# Patient Record
Sex: FEMALE | Race: WHITE | Employment: OTHER | URBAN - METROPOLITAN AREA
[De-identification: names, ages, dates, MRNs, and addresses within clinical notes are randomized per-mention and may not be internally consistent; named-entity substitution may affect disease eponyms.]

---

## 2017-10-02 ENCOUNTER — OFFICE VISIT (OUTPATIENT)
Dept: FAMILY MEDICINE CLINIC | Facility: CLINIC | Age: 37
End: 2017-10-02

## 2017-10-02 VITALS
RESPIRATION RATE: 16 BRPM | HEART RATE: 83 BPM | DIASTOLIC BLOOD PRESSURE: 69 MMHG | BODY MASS INDEX: 30 KG/M2 | SYSTOLIC BLOOD PRESSURE: 100 MMHG | TEMPERATURE: 98 F | WEIGHT: 175.63 LBS

## 2017-10-02 DIAGNOSIS — H92.02 LEFT EAR PAIN: Primary | ICD-10-CM

## 2017-10-02 PROCEDURE — 99212 OFFICE O/P EST SF 10 MIN: CPT | Performed by: FAMILY MEDICINE

## 2017-10-02 PROCEDURE — 99213 OFFICE O/P EST LOW 20 MIN: CPT | Performed by: FAMILY MEDICINE

## 2017-10-02 RX ORDER — ALBUTEROL SULFATE 90 UG/1
2 AEROSOL, METERED RESPIRATORY (INHALATION) EVERY 4 HOURS PRN
Qty: 1 INHALER | Refills: 2 | Status: SHIPPED | OUTPATIENT
Start: 2017-10-02 | End: 2018-02-06

## 2017-10-02 RX ORDER — MONTELUKAST SODIUM 10 MG/1
10 TABLET ORAL DAILY
Qty: 30 TABLET | Refills: 3 | Status: SHIPPED | OUTPATIENT
Start: 2017-10-02 | End: 2018-03-01 | Stop reason: ALTCHOICE

## 2017-10-02 NOTE — PROGRESS NOTES
HPI: Israel Irene is a 40year old female who presents for left ear pain for one week. Started as dull ache and then persisted. Difficulty eating as well. Has allergic symptoms- nasal congestion. Has had ear infections in the past few years.   Hearing sligh

## 2018-02-06 ENCOUNTER — OFFICE VISIT (OUTPATIENT)
Dept: FAMILY MEDICINE CLINIC | Facility: CLINIC | Age: 38
End: 2018-02-06

## 2018-02-06 VITALS
RESPIRATION RATE: 16 BRPM | TEMPERATURE: 98 F | HEART RATE: 73 BPM | OXYGEN SATURATION: 97 % | DIASTOLIC BLOOD PRESSURE: 74 MMHG | SYSTOLIC BLOOD PRESSURE: 112 MMHG | HEIGHT: 64 IN

## 2018-02-06 DIAGNOSIS — J45.21 MILD INTERMITTENT ASTHMA WITH ACUTE EXACERBATION: Primary | ICD-10-CM

## 2018-02-06 PROCEDURE — 99212 OFFICE O/P EST SF 10 MIN: CPT | Performed by: FAMILY MEDICINE

## 2018-02-06 PROCEDURE — 99214 OFFICE O/P EST MOD 30 MIN: CPT | Performed by: FAMILY MEDICINE

## 2018-02-06 RX ORDER — ALBUTEROL SULFATE 90 UG/1
2 AEROSOL, METERED RESPIRATORY (INHALATION) EVERY 4 HOURS PRN
Qty: 1 INHALER | Refills: 2 | Status: SHIPPED | OUTPATIENT
Start: 2018-02-06

## 2018-02-06 RX ORDER — ALBUTEROL SULFATE 2.5 MG/3ML
2.5 SOLUTION RESPIRATORY (INHALATION) ONCE
Status: SHIPPED | OUTPATIENT
Start: 2018-02-06

## 2018-02-06 RX ORDER — FLUTICASONE PROPIONATE 50 MCG
2 SPRAY, SUSPENSION (ML) NASAL DAILY
Qty: 1 BOTTLE | Refills: 1 | Status: SHIPPED | OUTPATIENT
Start: 2018-02-06 | End: 2019-02-01

## 2018-02-06 RX ORDER — PREDNISONE 20 MG/1
20 TABLET ORAL DAILY
Qty: 5 TABLET | Refills: 0 | Status: SHIPPED | OUTPATIENT
Start: 2018-02-06 | End: 2018-02-11

## 2018-02-06 RX ORDER — ALBUTEROL SULFATE 2.5 MG/3ML
2.5 SOLUTION RESPIRATORY (INHALATION) EVERY 6 HOURS PRN
Qty: 50 AMPULE | Refills: 0 | Status: SHIPPED | OUTPATIENT
Start: 2018-02-06

## 2018-02-06 NOTE — PROGRESS NOTES
HPI:    So Villagran is a 41year old female presents to clinic with concerns regarding asthma. States that Thursday night she cleaned out her basement and was feeling a bit tight in her lungs. States that over the weekend she was ok.  Last night she mg total) by mouth daily.  Disp: 30 tablet Rfl: 3       Allergies:    Ceclor [Cefaclor]             ROS:   See HPI     PHYSICAL EXAM:      02/06/18  1022   BP: 112/74   BP Location: Left arm   Patient Position: Sitting   Cuff Size: adult   Pulse: 73   Resp: Wheezing. predniSONE 20 MG Oral Tab 5 tablet 0      Sig: Take 1 tablet (20 mg total) by mouth daily.       albuterol sulfate (2.5 MG/3ML) 0.083% Inhalation Nebu Soln 50 ampule 0      Sig: Take 3 mL (2.5 mg total) by nebulization every 6 (six) hours as

## 2018-02-09 ENCOUNTER — APPOINTMENT (OUTPATIENT)
Dept: GENERAL RADIOLOGY | Age: 38
End: 2018-02-09
Attending: FAMILY MEDICINE
Payer: COMMERCIAL

## 2018-02-09 ENCOUNTER — NURSE TRIAGE (OUTPATIENT)
Dept: OTHER | Age: 38
End: 2018-02-09

## 2018-02-09 ENCOUNTER — HOSPITAL ENCOUNTER (OUTPATIENT)
Age: 38
Discharge: HOME | End: 2018-02-09
Attending: FAMILY MEDICINE
Payer: COMMERCIAL

## 2018-02-09 VITALS
OXYGEN SATURATION: 99 % | WEIGHT: 185 LBS | SYSTOLIC BLOOD PRESSURE: 118 MMHG | DIASTOLIC BLOOD PRESSURE: 69 MMHG | HEART RATE: 91 BPM | RESPIRATION RATE: 20 BRPM | HEIGHT: 64 IN | BODY MASS INDEX: 31.58 KG/M2 | TEMPERATURE: 97 F

## 2018-02-09 DIAGNOSIS — J45.901 ASTHMA EXACERBATION, MILD: ICD-10-CM

## 2018-02-09 DIAGNOSIS — J01.10 ACUTE NON-RECURRENT FRONTAL SINUSITIS: Primary | ICD-10-CM

## 2018-02-09 LAB — B-HCG UR QL: NEGATIVE

## 2018-02-09 PROCEDURE — 71046 X-RAY EXAM CHEST 2 VIEWS: CPT | Performed by: FAMILY MEDICINE

## 2018-02-09 PROCEDURE — 99214 OFFICE O/P EST MOD 30 MIN: CPT

## 2018-02-09 PROCEDURE — 99213 OFFICE O/P EST LOW 20 MIN: CPT

## 2018-02-09 PROCEDURE — 81025 URINE PREGNANCY TEST: CPT

## 2018-02-09 RX ORDER — PREDNISONE 20 MG/1
20 TABLET ORAL 2 TIMES DAILY
Qty: 6 TABLET | Refills: 0 | Status: SHIPPED | OUTPATIENT
Start: 2018-02-09 | End: 2018-02-12

## 2018-02-09 RX ORDER — AMOXICILLIN AND CLAVULANATE POTASSIUM 875; 125 MG/1; MG/1
1 TABLET, FILM COATED ORAL 2 TIMES DAILY
Qty: 20 TABLET | Refills: 0 | Status: SHIPPED | OUTPATIENT
Start: 2018-02-09 | End: 2018-02-19

## 2018-02-09 NOTE — ED PROVIDER NOTES
Patient Seen in: [address]    History   Patient presents with:  Dyspnea LISA SOB (respiratory)    Stated Complaint: asthma acting up - shortness of breath & chest pain    HPI  58-year-old female with a past medical histo Ht 162.6 cm (5' 4\")   Wt 83.9 kg   LMP 01/14/2018   SpO2 99%   BMI 31.76 kg/m²         Physical Exam   Constitutional: She is oriented to person, place, and time. She appears well-developed. No distress.    HENT:   Right Ear: Tympanic membrane and ear Sharilyn Plants visible mass or adenopathy. LUNGS/PLEURA: Normal.  No significant pulmonary parenchymal abnormalities.  No effusion or pleural thickening.    BONES: Minimal lower thoracic spondylosis.   OTHER: Negative.    Dictated by (CST): Jasmin Lugo MD on 2/09/20

## 2018-02-09 NOTE — TELEPHONE ENCOUNTER
Spoke with pt and Dr Agudelo Doing recommendations given. Pt scheduled OV 2/12/18 and agrees to go to UC over weekend if symptoms worsen.

## 2018-02-09 NOTE — TELEPHONE ENCOUNTER
Action Requested: Summary for Provider     []  Critical Lab, Recommendations Needed  [] Need Additional Advice  []   FYI    []   Need Orders  [] Need Medications Sent to Pharmacy  []  Other     SUMMARY: Pt is calling for advice, possibly higher dose of pre

## 2018-02-09 NOTE — ED INITIAL ASSESSMENT (HPI)
Per pt has been feeling SOB since Monday reports was seen by PMD on Tuesday was given prednisone for asthma and reports has not been feeling better. Reports used nebulizer twice today since symptoms have worsened.  Pt reports slight nasal congestion reports

## 2018-02-12 ENCOUNTER — OFFICE VISIT (OUTPATIENT)
Dept: FAMILY MEDICINE CLINIC | Facility: CLINIC | Age: 38
End: 2018-02-12

## 2018-02-12 VITALS
WEIGHT: 185 LBS | HEIGHT: 64 IN | DIASTOLIC BLOOD PRESSURE: 73 MMHG | BODY MASS INDEX: 31.58 KG/M2 | SYSTOLIC BLOOD PRESSURE: 104 MMHG | HEART RATE: 78 BPM | TEMPERATURE: 98 F | RESPIRATION RATE: 14 BRPM

## 2018-02-12 DIAGNOSIS — J45.21 MILD INTERMITTENT ASTHMA WITH EXACERBATION: Primary | ICD-10-CM

## 2018-02-12 PROCEDURE — 99212 OFFICE O/P EST SF 10 MIN: CPT | Performed by: FAMILY MEDICINE

## 2018-02-12 PROCEDURE — 99213 OFFICE O/P EST LOW 20 MIN: CPT | Performed by: FAMILY MEDICINE

## 2018-02-12 RX ORDER — PREDNISONE 10 MG/1
TABLET ORAL
Qty: 9 TABLET | Refills: 0 | Status: SHIPPED | OUTPATIENT
Start: 2018-02-12 | End: 2018-03-01 | Stop reason: ALTCHOICE

## 2018-02-13 NOTE — H&P
HPI:    Vika Miles is a 41year old female presents to clinic for follow up. States that she went in to UC a few days back after OV was told she may have a sinus infection. Prednisone was increased. Normal CXR.  Says today she does not feel that mu Allergies:    Ceclor [Cefaclor]             ROS:   See HPI   PHYSICAL EXAM:      02/12/18  1751   BP: 104/73   BP Location: Left arm   Patient Position: Sitting   Cuff Size: adult   Pulse: 78   Resp: 14   Temp: 97.7 °F (36.5 °C)   TempSrc: Oral   Tyrone De Pazfield MD

## 2018-02-14 ENCOUNTER — HOSPITAL ENCOUNTER (OUTPATIENT)
Age: 38
Discharge: HOME | End: 2018-02-14
Attending: FAMILY MEDICINE
Payer: COMMERCIAL

## 2018-02-14 ENCOUNTER — TELEPHONE (OUTPATIENT)
Dept: FAMILY MEDICINE CLINIC | Facility: CLINIC | Age: 38
End: 2018-02-14

## 2018-02-14 ENCOUNTER — HOSPITAL ENCOUNTER (OUTPATIENT)
Dept: CT IMAGING | Facility: HOSPITAL | Age: 38
Discharge: HOME | End: 2018-02-14
Attending: FAMILY MEDICINE
Payer: COMMERCIAL

## 2018-02-14 VITALS
WEIGHT: 185 LBS | OXYGEN SATURATION: 97 % | TEMPERATURE: 98 F | HEIGHT: 64 IN | DIASTOLIC BLOOD PRESSURE: 66 MMHG | RESPIRATION RATE: 20 BRPM | HEART RATE: 76 BPM | BODY MASS INDEX: 31.58 KG/M2 | SYSTOLIC BLOOD PRESSURE: 126 MMHG

## 2018-02-14 DIAGNOSIS — J40 BRONCHITIS: ICD-10-CM

## 2018-02-14 DIAGNOSIS — R06.4 HYPERVENTILATION: Primary | ICD-10-CM

## 2018-02-14 DIAGNOSIS — R06.02 SHORTNESS OF BREATH: ICD-10-CM

## 2018-02-14 LAB — CREAT BLD-MCNC: 0.8 MG/DL (ref 0.5–1.5)

## 2018-02-14 PROCEDURE — 71270 CT THORAX DX C-/C+: CPT | Performed by: FAMILY MEDICINE

## 2018-02-14 PROCEDURE — 82565 ASSAY OF CREATININE: CPT

## 2018-02-14 PROCEDURE — 71260 CT THORAX DX C+: CPT | Performed by: FAMILY MEDICINE

## 2018-02-14 PROCEDURE — 99212 OFFICE O/P EST SF 10 MIN: CPT

## 2018-02-14 PROCEDURE — 99211 OFF/OP EST MAY X REQ PHY/QHP: CPT

## 2018-02-14 NOTE — ED PROVIDER NOTES
Patient Seen in: [address]    History   CC:  Patient presents with:  Cough/URI    Stated Complaint: asthma problems    ------------------------------  Per Rn:   Per pt has been having cough since last Monday reports has 01/12/2018   SpO2 97%   BMI 31.76 kg/m²         General Appearance:    Alert, cooperative, no distress, appears stated age   Head:    Normocephalic, without obvious abnormality, atraumatic   Eyes:    PERRL, conjunctiva/corneas clear, EOM's intact   Ears:

## 2018-02-14 NOTE — ED INITIAL ASSESSMENT (HPI)
Per pt has been having cough since last Monday reports has been feeling SOB used inhaler 5 minutes ago with some relief. Pt was seen here last week followed up with PMD on Monday has been using prednisone with no relief.  At this time pt with easy even resp

## 2018-02-14 NOTE — TELEPHONE ENCOUNTER
Spoke to patient, says that at [facility] they were unhelpful. She is concerned states that she continues to feel short of breath while on steroids and abx. CT ordered stat, will call Radiology to inform them.

## 2018-02-15 ENCOUNTER — TELEPHONE (OUTPATIENT)
Dept: FAMILY MEDICINE CLINIC | Facility: CLINIC | Age: 38
End: 2018-02-15

## 2018-02-15 NOTE — TELEPHONE ENCOUNTER
Pt called stating that she is doing [facility] better, but she is close to being out of medication below. Pt states that Dr. Cruzito Rodriguez had modified dosage to combat an episode, Pt wants to verify the dosage for her taper off. Please advise.      Current Outpatient

## 2018-03-01 ENCOUNTER — OFFICE VISIT (OUTPATIENT)
Dept: FAMILY MEDICINE CLINIC | Facility: CLINIC | Age: 38
End: 2018-03-01

## 2018-03-01 ENCOUNTER — TELEPHONE (OUTPATIENT)
Dept: OTHER | Age: 38
End: 2018-03-01

## 2018-03-01 VITALS
TEMPERATURE: 98 F | RESPIRATION RATE: 17 BRPM | BODY MASS INDEX: 31.01 KG/M2 | DIASTOLIC BLOOD PRESSURE: 72 MMHG | WEIGHT: 181.63 LBS | SYSTOLIC BLOOD PRESSURE: 106 MMHG | HEIGHT: 64 IN | HEART RATE: 79 BPM

## 2018-03-01 DIAGNOSIS — R42 DIZZINESS: ICD-10-CM

## 2018-03-01 DIAGNOSIS — J01.90 ACUTE SINUSITIS, RECURRENCE NOT SPECIFIED, UNSPECIFIED LOCATION: Primary | ICD-10-CM

## 2018-03-01 DIAGNOSIS — D17.1 LIPOMA OF ANTERIOR CHEST WALL: ICD-10-CM

## 2018-03-01 PROCEDURE — 99212 OFFICE O/P EST SF 10 MIN: CPT | Performed by: FAMILY MEDICINE

## 2018-03-01 PROCEDURE — 99213 OFFICE O/P EST LOW 20 MIN: CPT | Performed by: FAMILY MEDICINE

## 2018-03-01 RX ORDER — CLINDAMYCIN HYDROCHLORIDE 300 MG/1
300 CAPSULE ORAL 3 TIMES DAILY
Qty: 30 CAPSULE | Refills: 0 | Status: SHIPPED | OUTPATIENT
Start: 2018-03-01 | End: 2018-03-11

## 2018-03-01 NOTE — PROGRESS NOTES
HPI:    Patient ID: Pepe Sheldon is a 41year old female. Dizziness   This is a new problem. The current episode started 1 to 4 weeks ago. The problem has been waxing and waning. Associated symptoms include congestion.  Pertinent negatives include n regular rhythm. Pulmonary/Chest: Effort normal and breath sounds normal. She has no wheezes. Skin: Skin is warm and dry.               ASSESSMENT/PLAN:   Acute sinusitis, recurrence not specified, unspecified location  (primary encounter diagnosis)  Ml Webb

## 2018-03-01 NOTE — TELEPHONE ENCOUNTER
Pt stts continues to have a productive cough,weakness,ear discomfort in both ears and discomfort under right breast.No fever or shortness of breath. Appt made for today with Dr. Gold Winters.

## 2018-03-17 ENCOUNTER — APPOINTMENT (OUTPATIENT)
Dept: GENERAL RADIOLOGY | Facility: HOSPITAL | Age: 38
End: 2018-03-17
Attending: EMERGENCY MEDICINE
Payer: COMMERCIAL

## 2018-03-17 ENCOUNTER — HOSPITAL ENCOUNTER (EMERGENCY)
Facility: HOSPITAL | Age: 38
Discharge: HOME | End: 2018-03-17
Attending: EMERGENCY MEDICINE
Payer: COMMERCIAL

## 2018-03-17 VITALS
RESPIRATION RATE: 16 BRPM | DIASTOLIC BLOOD PRESSURE: 79 MMHG | HEIGHT: 63 IN | HEART RATE: 85 BPM | OXYGEN SATURATION: 97 % | TEMPERATURE: 98 F | BODY MASS INDEX: 31.89 KG/M2 | WEIGHT: 180 LBS | SYSTOLIC BLOOD PRESSURE: 122 MMHG

## 2018-03-17 DIAGNOSIS — R07.89 CHEST PAIN, ATYPICAL: Primary | ICD-10-CM

## 2018-03-17 LAB
ANION GAP SERPL CALC-SCNC: 7 MMOL/L (ref 0–18)
B-HCG UR QL: NEGATIVE
BASOPHILS # BLD: 0.1 K/UL (ref 0–0.2)
BASOPHILS NFR BLD: 1 %
BUN SERPL-MCNC: 12 MG/DL (ref 8–20)
BUN/CREAT SERPL: 16.4 (ref 10–20)
CALCIUM SERPL-MCNC: 9.2 MG/DL (ref 8.5–10.5)
CHLORIDE SERPL-SCNC: 103 MMOL/L (ref 95–110)
CO2 SERPL-SCNC: 28 MMOL/L (ref 22–32)
CREAT SERPL-MCNC: 0.73 MG/DL (ref 0.5–1.5)
EOSINOPHIL # BLD: 0.2 K/UL (ref 0–0.7)
EOSINOPHIL NFR BLD: 4 %
ERYTHROCYTE [DISTWIDTH] IN BLOOD BY AUTOMATED COUNT: 13.6 % (ref 11–15)
GLUCOSE SERPL-MCNC: 91 MG/DL (ref 70–99)
HCT VFR BLD AUTO: 41.3 % (ref 35–48)
HGB BLD-MCNC: 14 G/DL (ref 12–16)
LYMPHOCYTES # BLD: 2.2 K/UL (ref 1–4)
LYMPHOCYTES NFR BLD: 34 %
MCH RBC QN AUTO: 29.7 PG (ref 27–32)
MCHC RBC AUTO-ENTMCNC: 34 G/DL (ref 32–37)
MCV RBC AUTO: 87.3 FL (ref 80–100)
MONOCYTES # BLD: 0.7 K/UL (ref 0–1)
MONOCYTES NFR BLD: 11 %
NEUTROPHILS # BLD AUTO: 3.3 K/UL (ref 1.8–7.7)
NEUTROPHILS NFR BLD: 50 %
OSMOLALITY UR CALC.SUM OF ELEC: 285 MOSM/KG (ref 275–295)
PLATELET # BLD AUTO: 266 K/UL (ref 140–400)
PMV BLD AUTO: 7.1 FL (ref 7.4–10.3)
POTASSIUM SERPL-SCNC: 3.9 MMOL/L (ref 3.3–5.1)
RBC # BLD AUTO: 4.73 M/UL (ref 3.7–5.4)
SODIUM SERPL-SCNC: 138 MMOL/L (ref 136–144)
TROPONIN I SERPL-MCNC: 0 NG/ML (ref ?–0.03)
WBC # BLD AUTO: 6.6 K/UL (ref 4–11)

## 2018-03-17 PROCEDURE — 93010 ELECTROCARDIOGRAM REPORT: CPT | Performed by: INTERNAL MEDICINE

## 2018-03-17 PROCEDURE — 36415 COLL VENOUS BLD VENIPUNCTURE: CPT

## 2018-03-17 PROCEDURE — 80048 BASIC METABOLIC PNL TOTAL CA: CPT | Performed by: EMERGENCY MEDICINE

## 2018-03-17 PROCEDURE — 84484 ASSAY OF TROPONIN QUANT: CPT | Performed by: EMERGENCY MEDICINE

## 2018-03-17 PROCEDURE — 93005 ELECTROCARDIOGRAM TRACING: CPT

## 2018-03-17 PROCEDURE — 71046 X-RAY EXAM CHEST 2 VIEWS: CPT | Performed by: EMERGENCY MEDICINE

## 2018-03-17 PROCEDURE — 81025 URINE PREGNANCY TEST: CPT

## 2018-03-17 PROCEDURE — 85025 COMPLETE CBC W/AUTO DIFF WBC: CPT | Performed by: EMERGENCY MEDICINE

## 2018-03-17 PROCEDURE — 99285 EMERGENCY DEPT VISIT HI MDM: CPT

## 2018-03-17 NOTE — ED INITIAL ASSESSMENT (HPI)
Patient presents to triage with complaints of chest tightness for the past week with increased pain today. Patient states recently finished abx and steroid treatment for asthma flare up.

## 2018-03-18 NOTE — ED PROVIDER NOTES
Patient Seen in: [facility] AND [facility] Emergency Department    History   Patient presents with:  Chest Pain Angina (cardiovascular): onset 1 week    Stated Complaint: Chest Pain    HPI    Pt is 40 yo F who p/w squeezing sensation in left chest x 3 hours.  Pt RRR, no murmurs  Resp: +expiratory wheeze intermittent in lower lung fields, no retractions, no chest wall tenderness  Ab: soft, nontender, no distension  Extremities: FROM of all extremities, no cyanosis/clubbing/edema  Neuro: CN intact, normal speech, no Disposition and Plan     Clinical Impression:  Chest pain, atypical  (primary encounter diagnosis)    Disposition:  Discharge  3/17/2018  9:38 pm    Follow-up:  Modesta Scott MD  [address] [phone]    In 2

## 2018-03-27 ENCOUNTER — OFFICE VISIT (OUTPATIENT)
Dept: FAMILY MEDICINE CLINIC | Facility: CLINIC | Age: 38
End: 2018-03-27

## 2018-03-27 ENCOUNTER — TELEPHONE (OUTPATIENT)
Dept: OTHER | Age: 38
End: 2018-03-27

## 2018-03-27 VITALS
BODY MASS INDEX: 31.07 KG/M2 | TEMPERATURE: 98 F | SYSTOLIC BLOOD PRESSURE: 107 MMHG | WEIGHT: 182 LBS | HEIGHT: 64 IN | DIASTOLIC BLOOD PRESSURE: 73 MMHG | RESPIRATION RATE: 18 BRPM | HEART RATE: 87 BPM

## 2018-03-27 DIAGNOSIS — R42 VERTIGO: Primary | ICD-10-CM

## 2018-03-27 DIAGNOSIS — R11.0 NAUSEA: ICD-10-CM

## 2018-03-27 LAB
CONTROL LINE PRESENT WITH A CLEAR BACKGROUND (YES/NO): YES YES/NO
KIT LOT #: NORMAL NUMERIC
PREGNANCY TEST, URINE: NEGATIVE

## 2018-03-27 PROCEDURE — 81025 URINE PREGNANCY TEST: CPT | Performed by: FAMILY MEDICINE

## 2018-03-27 PROCEDURE — 99212 OFFICE O/P EST SF 10 MIN: CPT | Performed by: FAMILY MEDICINE

## 2018-03-27 PROCEDURE — 99214 OFFICE O/P EST MOD 30 MIN: CPT | Performed by: FAMILY MEDICINE

## 2018-03-27 NOTE — TELEPHONE ENCOUNTER
Pt stated that she is just trying to make a f/u appt from 3/1/2018. She stated that she continues to have dizziness and she has finished the abx that was given to her. The dizziness is there more when she lays down.  She then also mentioned that she was in

## 2018-03-29 NOTE — PROGRESS NOTES
HPI:    Robbi Pedraza is a 41year old female presents to clinic for follow up. About 3 weeks back, patient was diagnosed with a sinus infection.  Notes at the time she had some ear discomfort and dizziness, along with congestion and headaches but now (36.6 °C)   TempSrc: Oral   Weight: 182 lb (82.6 kg)   Height: 5' 4\" (1.626 m)     Physical Exam   Constitutional: She is well-developed, well-nourished, and in no distress. HENT:   Head: Normocephalic and atraumatic.    Right Ear: Tympanic membrane, ext

## 2018-05-08 ENCOUNTER — OFFICE VISIT (OUTPATIENT)
Dept: OTOLARYNGOLOGY | Facility: CLINIC | Age: 38
End: 2018-05-08

## 2018-05-08 ENCOUNTER — OFFICE VISIT (OUTPATIENT)
Dept: AUDIOLOGY | Facility: CLINIC | Age: 38
End: 2018-05-08

## 2018-05-08 VITALS
TEMPERATURE: 98 F | HEIGHT: 64 IN | WEIGHT: 185 LBS | SYSTOLIC BLOOD PRESSURE: 99 MMHG | DIASTOLIC BLOOD PRESSURE: 60 MMHG | BODY MASS INDEX: 31.58 KG/M2

## 2018-05-08 DIAGNOSIS — R42 DIZZINESS: Primary | ICD-10-CM

## 2018-05-08 DIAGNOSIS — R42 DIZZINESS AND GIDDINESS: Primary | ICD-10-CM

## 2018-05-08 PROCEDURE — 92557 COMPREHENSIVE HEARING TEST: CPT | Performed by: AUDIOLOGIST

## 2018-05-08 PROCEDURE — 92567 TYMPANOMETRY: CPT | Performed by: AUDIOLOGIST

## 2018-05-08 PROCEDURE — 99212 OFFICE O/P EST SF 10 MIN: CPT | Performed by: OTOLARYNGOLOGY

## 2018-05-08 PROCEDURE — 99243 OFF/OP CNSLTJ NEW/EST LOW 30: CPT | Performed by: OTOLARYNGOLOGY

## 2018-05-08 NOTE — PROGRESS NOTES
Vika Miles is a 41year old female.   Patient presents with:  Dizziness: daily for 2 months, pt is currently 5 weeks pregnant       HISTORY OF PRESENT ILLNESS  She presents with a two-month history of spinning vertigo which lasts anywhere between 10 wheezing. Cardio Negative Chest pain, irregular heartbeat/palpitations and syncope. GI Negative Abdominal pain and diarrhea. Endocrine Negative Cold intolerance and heat intolerance. Neuro Negative Tremors. Psych Negative Anxiety and depression. mouth daily. , Disp: , Rfl:   •  Ergocalciferol (VITAMIN D OR), Take 1 tablet by mouth daily. , Disp: , Rfl:   •  Albuterol Sulfate HFA (VENTOLIN HFA) 108 (90 Base) MCG/ACT Inhalation Aero Soln, Inhale 2 puffs into the lungs every 4 (four) hours as needed fo

## 2018-05-08 NOTE — PROGRESS NOTES
AUDIOGRAM     Cande Rowland was referred for testing by Philmore Dakin due to dizziness. 1/22/1980  [id number]    Otoscopic Inspection:  Right ear:  No cerumen  Left ear:  No cerumen    Audiometric Test Results:   Audiometric thresholds indicated nor

## 2022-04-06 ENCOUNTER — OFFICE VISIT (OUTPATIENT)
Dept: OBGYN CLINIC | Facility: CLINIC | Age: 42
End: 2022-04-06
Payer: COMMERCIAL

## 2022-04-06 VITALS
HEIGHT: 64 IN | SYSTOLIC BLOOD PRESSURE: 118 MMHG | BODY MASS INDEX: 35.82 KG/M2 | WEIGHT: 209.81 LBS | DIASTOLIC BLOOD PRESSURE: 68 MMHG

## 2022-04-06 DIAGNOSIS — Z32.01 PREGNANCY CONFIRMED BY POSITIVE URINE TEST: Primary | ICD-10-CM

## 2022-04-06 LAB
CONTROL LINE PRESENT WITH A CLEAR BACKGROUND (YES/NO): YES YES/NO
PREGNANCY TEST, URINE: POSITIVE

## 2022-04-06 PROCEDURE — 81025 URINE PREGNANCY TEST: CPT | Performed by: OBSTETRICS & GYNECOLOGY

## 2022-04-06 PROCEDURE — 3078F DIAST BP <80 MM HG: CPT | Performed by: OBSTETRICS & GYNECOLOGY

## 2022-04-06 PROCEDURE — 3074F SYST BP LT 130 MM HG: CPT | Performed by: OBSTETRICS & GYNECOLOGY

## 2022-04-06 PROCEDURE — 99204 OFFICE O/P NEW MOD 45 MIN: CPT | Performed by: OBSTETRICS & GYNECOLOGY

## 2022-04-06 PROCEDURE — 3008F BODY MASS INDEX DOCD: CPT | Performed by: OBSTETRICS & GYNECOLOGY

## 2022-04-06 RX ORDER — DOXYLAMINE SUCCINATE AND PYRIDOXINE HYDROCHLORIDE 10; 10 MG/1; MG/1
1 TABLET, DELAYED RELEASE ORAL 4 TIMES DAILY
Qty: 120 TABLET | Refills: 3 | Status: SHIPPED | OUTPATIENT
Start: 2022-04-06

## 2022-04-06 RX ORDER — ALBUTEROL SULFATE 90 UG/1
2 AEROSOL, METERED RESPIRATORY (INHALATION) EVERY 4 HOURS PRN
Qty: 1 EACH | Refills: 3 | Status: SHIPPED | OUTPATIENT
Start: 2022-04-06

## 2022-04-07 ENCOUNTER — MED REC SCAN ONLY (OUTPATIENT)
Dept: OBGYN CLINIC | Facility: CLINIC | Age: 42
End: 2022-04-07

## 2022-04-15 ENCOUNTER — OFFICE VISIT (OUTPATIENT)
Dept: FAMILY MEDICINE CLINIC | Facility: CLINIC | Age: 42
End: 2022-04-15
Payer: COMMERCIAL

## 2022-04-15 VITALS
OXYGEN SATURATION: 96 % | SYSTOLIC BLOOD PRESSURE: 112 MMHG | HEIGHT: 64 IN | BODY MASS INDEX: 35.68 KG/M2 | RESPIRATION RATE: 19 BRPM | DIASTOLIC BLOOD PRESSURE: 76 MMHG | HEART RATE: 92 BPM | WEIGHT: 209 LBS | TEMPERATURE: 97 F

## 2022-04-15 DIAGNOSIS — J06.9 VIRAL URI WITH COUGH: Primary | ICD-10-CM

## 2022-04-15 PROCEDURE — 3074F SYST BP LT 130 MM HG: CPT | Performed by: FAMILY MEDICINE

## 2022-04-15 PROCEDURE — 99202 OFFICE O/P NEW SF 15 MIN: CPT | Performed by: FAMILY MEDICINE

## 2022-04-15 PROCEDURE — 3078F DIAST BP <80 MM HG: CPT | Performed by: FAMILY MEDICINE

## 2022-04-15 PROCEDURE — 3008F BODY MASS INDEX DOCD: CPT | Performed by: FAMILY MEDICINE

## 2022-04-20 ENCOUNTER — TELEPHONE (OUTPATIENT)
Dept: OBGYN CLINIC | Facility: CLINIC | Age: 42
End: 2022-04-20

## 2022-05-05 ENCOUNTER — NURSE ONLY (OUTPATIENT)
Dept: OBGYN CLINIC | Facility: CLINIC | Age: 42
End: 2022-05-05
Payer: COMMERCIAL

## 2022-05-05 DIAGNOSIS — O99.341 DEPRESSION AFFECTING PREGNANCY IN FIRST TRIMESTER, ANTEPARTUM: ICD-10-CM

## 2022-05-05 DIAGNOSIS — Z34.81 ENCOUNTER FOR SUPERVISION OF OTHER NORMAL PREGNANCY IN FIRST TRIMESTER: Primary | ICD-10-CM

## 2022-05-05 DIAGNOSIS — F32.A DEPRESSION AFFECTING PREGNANCY IN FIRST TRIMESTER, ANTEPARTUM: ICD-10-CM

## 2022-05-05 NOTE — PROGRESS NOTES
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 Pregnancy Confirmation apt with: MA    LMP: 2022    US: 22 7W 2D    Working OLGA:  22    Pre  BMI:   35.86    Medical Hx significant for: Asthma, c/o anxiety, hx of GC treated. Obstetrical Hx significant for:  x2, AB x2 HPV, LEEP    Surgical Hx significant for: see above    EPDS score: 9 referral for [facility]    OUD Screening: with hx of father with ETOH abuse    Patient given \"What Pregnant Women Need to Know\" handout. Educational material reviewed with patient: Prenatal care, nutrition, weight gain recommendations, travel, exercise, intercourse, pregnancy changes, safe medications, pregnancy and work, fetal movement, labor and  labor, warning signs, food safety, tdap, cord blood, breastfeeding, circumcision, and Group B strep. Pt agrees to blood transfusion if needed: yes    PN labs ordered     Optional genetic screening discussed. Pt desires/ declines foresight and/or prequel:    [facility] Media Policy: Reviewed and verbalized understanding.      NOB appt: 2022    Lab appt: to be done at [facility]

## 2022-05-12 ENCOUNTER — MED REC SCAN ONLY (OUTPATIENT)
Dept: OBGYN CLINIC | Facility: CLINIC | Age: 42
End: 2022-05-12

## 2022-05-12 ENCOUNTER — INITIAL PRENATAL (OUTPATIENT)
Dept: OBGYN CLINIC | Facility: CLINIC | Age: 42
End: 2022-05-12
Payer: COMMERCIAL

## 2022-05-12 VITALS
DIASTOLIC BLOOD PRESSURE: 74 MMHG | HEIGHT: 64 IN | BODY MASS INDEX: 35.37 KG/M2 | WEIGHT: 207.19 LBS | SYSTOLIC BLOOD PRESSURE: 118 MMHG

## 2022-05-12 DIAGNOSIS — Z98.890 HISTORY OF LOOP ELECTROSURGICAL EXCISION PROCEDURE (LEEP) AFFECTING CARE OF MOTHER, ANTEPARTUM: ICD-10-CM

## 2022-05-12 DIAGNOSIS — J45.909 ASTHMA AFFECTING PREGNANCY, ANTEPARTUM: ICD-10-CM

## 2022-05-12 DIAGNOSIS — O99.519 ASTHMA AFFECTING PREGNANCY, ANTEPARTUM: ICD-10-CM

## 2022-05-12 DIAGNOSIS — O99.891 HISTORY OF POSTPARTUM DEPRESSION, CURRENTLY PREGNANT: ICD-10-CM

## 2022-05-12 DIAGNOSIS — O34.40 HISTORY OF LOOP ELECTROSURGICAL EXCISION PROCEDURE (LEEP) AFFECTING CARE OF MOTHER, ANTEPARTUM: ICD-10-CM

## 2022-05-12 DIAGNOSIS — Z86.59 HISTORY OF POSTPARTUM DEPRESSION, CURRENTLY PREGNANT: ICD-10-CM

## 2022-05-12 DIAGNOSIS — Z36.9 UNSPECIFIED ANTENATAL SCREENING: Primary | ICD-10-CM

## 2022-05-12 DIAGNOSIS — O99.210 OBESITY AFFECTING PREGNANCY, ANTEPARTUM: ICD-10-CM

## 2022-05-12 DIAGNOSIS — O99.891 BACK PAIN AFFECTING PREGNANCY, ANTEPARTUM: ICD-10-CM

## 2022-05-12 DIAGNOSIS — O09.529 ANTEPARTUM MULTIGRAVIDA OF ADVANCED MATERNAL AGE: ICD-10-CM

## 2022-05-12 DIAGNOSIS — M54.9 BACK PAIN AFFECTING PREGNANCY, ANTEPARTUM: ICD-10-CM

## 2022-05-12 LAB
GLUCOSE (URINE DIPSTICK): NEGATIVE MG/DL
MULTISTIX LOT#: NORMAL NUMERIC
PROTEIN (URINE DIPSTICK): NEGATIVE MG/DL

## 2022-05-12 PROCEDURE — 87591 N.GONORRHOEAE DNA AMP PROB: CPT | Performed by: OBSTETRICS & GYNECOLOGY

## 2022-05-12 PROCEDURE — 81002 URINALYSIS NONAUTO W/O SCOPE: CPT | Performed by: OBSTETRICS & GYNECOLOGY

## 2022-05-12 PROCEDURE — 3008F BODY MASS INDEX DOCD: CPT | Performed by: OBSTETRICS & GYNECOLOGY

## 2022-05-12 PROCEDURE — 3074F SYST BP LT 130 MM HG: CPT | Performed by: OBSTETRICS & GYNECOLOGY

## 2022-05-12 PROCEDURE — 88175 CYTOPATH C/V AUTO FLUID REDO: CPT | Performed by: OBSTETRICS & GYNECOLOGY

## 2022-05-12 PROCEDURE — 87624 HPV HI-RISK TYP POOLED RSLT: CPT | Performed by: OBSTETRICS & GYNECOLOGY

## 2022-05-12 PROCEDURE — 87491 CHLMYD TRACH DNA AMP PROBE: CPT | Performed by: OBSTETRICS & GYNECOLOGY

## 2022-05-12 PROCEDURE — 3078F DIAST BP <80 MM HG: CPT | Performed by: OBSTETRICS & GYNECOLOGY

## 2022-05-13 LAB
BASOPHILS # BLD AUTO: 0.01 X10(3) UL (ref 0–0.2)
BASOPHILS NFR BLD AUTO: 0.2 %
C TRACH DNA SPEC QL NAA+PROBE: NEGATIVE
DEPRECATED RDW RBC AUTO: 42.5 FL (ref 35.1–46.3)
EOSINOPHIL # BLD AUTO: 0.07 X10(3) UL (ref 0–0.7)
EOSINOPHIL NFR BLD AUTO: 1.2 %
ERYTHROCYTE [DISTWIDTH] IN BLOOD BY AUTOMATED COUNT: 12.9 % (ref 11–15)
GLUCOSE 1H P GLC SERPL-MCNC: 128 MG/DL
HBV SURFACE AG SER-ACNC: <0.1 [IU]/L
HBV SURFACE AG SERPL QL IA: NONREACTIVE
HCT VFR BLD AUTO: 40 %
HCV AB SERPL QL IA: NONREACTIVE
HGB BLD-MCNC: 12.9 G/DL
HPV I/H RISK 1 DNA SPEC QL NAA+PROBE: NEGATIVE
IMM GRANULOCYTES # BLD AUTO: 0.02 X10(3) UL (ref 0–1)
IMM GRANULOCYTES NFR BLD: 0.3 %
LYMPHOCYTES # BLD AUTO: 1.35 X10(3) UL (ref 1–4)
LYMPHOCYTES NFR BLD AUTO: 22.5 %
MCH RBC QN AUTO: 28.9 PG (ref 26–34)
MCHC RBC AUTO-ENTMCNC: 32.3 G/DL (ref 31–37)
MCV RBC AUTO: 89.7 FL
MONOCYTES # BLD AUTO: 0.4 X10(3) UL (ref 0.1–1)
MONOCYTES NFR BLD AUTO: 6.7 %
N GONORRHOEA DNA SPEC QL NAA+PROBE: NEGATIVE
NEUTROPHILS # BLD AUTO: 4.16 X10 (3) UL (ref 1.5–7.7)
NEUTROPHILS # BLD AUTO: 4.16 X10(3) UL (ref 1.5–7.7)
NEUTROPHILS NFR BLD AUTO: 69.1 %
PLATELET # BLD AUTO: 222 10(3)UL (ref 150–450)
RBC # BLD AUTO: 4.46 X10(6)UL
RH BLOOD TYPE: POSITIVE
WBC # BLD AUTO: 6 X10(3) UL (ref 4–11)

## 2022-05-13 PROCEDURE — 82950 GLUCOSE TEST: CPT | Performed by: OBSTETRICS & GYNECOLOGY

## 2022-05-13 PROCEDURE — 87340 HEPATITIS B SURFACE AG IA: CPT | Performed by: OBSTETRICS & GYNECOLOGY

## 2022-05-13 PROCEDURE — 85025 COMPLETE CBC W/AUTO DIFF WBC: CPT | Performed by: OBSTETRICS & GYNECOLOGY

## 2022-05-13 PROCEDURE — 83021 HEMOGLOBIN CHROMOTOGRAPHY: CPT | Performed by: OBSTETRICS & GYNECOLOGY

## 2022-05-13 PROCEDURE — 87389 HIV-1 AG W/HIV-1&-2 AB AG IA: CPT | Performed by: OBSTETRICS & GYNECOLOGY

## 2022-05-13 PROCEDURE — 86900 BLOOD TYPING SEROLOGIC ABO: CPT | Performed by: OBSTETRICS & GYNECOLOGY

## 2022-05-13 PROCEDURE — 86803 HEPATITIS C AB TEST: CPT | Performed by: OBSTETRICS & GYNECOLOGY

## 2022-05-13 PROCEDURE — 83020 HEMOGLOBIN ELECTROPHORESIS: CPT | Performed by: OBSTETRICS & GYNECOLOGY

## 2022-05-13 PROCEDURE — 86901 BLOOD TYPING SEROLOGIC RH(D): CPT | Performed by: OBSTETRICS & GYNECOLOGY

## 2022-05-13 PROCEDURE — 86780 TREPONEMA PALLIDUM: CPT | Performed by: OBSTETRICS & GYNECOLOGY

## 2022-05-16 ENCOUNTER — TELEPHONE (OUTPATIENT)
Dept: OBGYN CLINIC | Facility: CLINIC | Age: 42
End: 2022-05-16

## 2022-05-16 LAB — T PALLIDUM AB SER QL: NEGATIVE

## 2022-05-18 ENCOUNTER — MED REC SCAN ONLY (OUTPATIENT)
Dept: OBGYN CLINIC | Facility: CLINIC | Age: 42
End: 2022-05-18

## 2022-05-18 LAB
HGB A2 MFR BLD: 2.8 % (ref 1.5–3.5)
HGB PNL BLD ELPH: 97.2 % (ref 95.5–100)

## 2022-05-23 ENCOUNTER — TELEPHONE (OUTPATIENT)
Dept: OBGYN CLINIC | Facility: CLINIC | Age: 42
End: 2022-05-23

## 2022-05-27 ENCOUNTER — MED REC SCAN ONLY (OUTPATIENT)
Dept: OBGYN CLINIC | Facility: CLINIC | Age: 42
End: 2022-05-27

## 2022-05-27 NOTE — TELEPHONE ENCOUNTER
RN spoke with pt, verified name and . Pt provided with negative/normal Foresight results. Pt verbalized understanding and agreed with POC.

## 2022-06-07 ENCOUNTER — PATIENT MESSAGE (OUTPATIENT)
Dept: OBGYN CLINIC | Facility: CLINIC | Age: 42
End: 2022-06-07

## 2022-06-07 NOTE — TELEPHONE ENCOUNTER
From: Elton Barker  To: Chiquita Dan MD  Sent: 6/7/2022 11:47 AM CDT  Subject: Covid Positive    Hi I just tested positive at home for covid. I have a terrible headache and congestion. What do I do now being pregnant? What medicines can I take for symptoms?      Thanks

## 2022-06-12 ENCOUNTER — HOSPITAL ENCOUNTER (OUTPATIENT)
Age: 42
Discharge: HOME | End: 2022-06-12
Payer: COMMERCIAL

## 2022-06-12 VITALS
TEMPERATURE: 98 F | OXYGEN SATURATION: 98 % | RESPIRATION RATE: 18 BRPM | SYSTOLIC BLOOD PRESSURE: 117 MMHG | DIASTOLIC BLOOD PRESSURE: 74 MMHG | HEART RATE: 98 BPM

## 2022-06-12 DIAGNOSIS — Z3A.16 16 WEEKS GESTATION OF PREGNANCY: ICD-10-CM

## 2022-06-12 DIAGNOSIS — U07.1 COVID-19: Primary | ICD-10-CM

## 2022-06-12 LAB — SARS-COV-2 RNA RESP QL NAA+PROBE: DETECTED

## 2022-06-12 PROCEDURE — U0002 COVID-19 LAB TEST NON-CDC: HCPCS | Performed by: NURSE PRACTITIONER

## 2022-06-12 PROCEDURE — 99203 OFFICE O/P NEW LOW 30 MIN: CPT | Performed by: NURSE PRACTITIONER

## 2022-06-12 NOTE — ED INITIAL ASSESSMENT (HPI)
Pt here stating she tested +covid 6/7/22 pt states she is 16 weeks pregnant, pt was told by her OB to come in for monoclonal therapy, pt states her symptoms are cough congestion and fatigue, pt denies any fevers

## 2022-06-13 ENCOUNTER — ULTRASOUND ENCOUNTER (OUTPATIENT)
Dept: OBGYN CLINIC | Facility: CLINIC | Age: 42
End: 2022-06-13
Payer: COMMERCIAL

## 2022-06-13 ENCOUNTER — ROUTINE PRENATAL (OUTPATIENT)
Dept: OBGYN CLINIC | Facility: CLINIC | Age: 42
End: 2022-06-13
Payer: COMMERCIAL

## 2022-06-13 ENCOUNTER — HOSPITAL ENCOUNTER (OUTPATIENT)
Age: 42
Discharge: HOME | End: 2022-06-13
Payer: COMMERCIAL

## 2022-06-13 VITALS
HEART RATE: 82 BPM | DIASTOLIC BLOOD PRESSURE: 77 MMHG | RESPIRATION RATE: 18 BRPM | OXYGEN SATURATION: 99 % | TEMPERATURE: 96 F | SYSTOLIC BLOOD PRESSURE: 117 MMHG

## 2022-06-13 VITALS
DIASTOLIC BLOOD PRESSURE: 74 MMHG | WEIGHT: 204 LBS | SYSTOLIC BLOOD PRESSURE: 120 MMHG | HEIGHT: 64 IN | BODY MASS INDEX: 34.83 KG/M2

## 2022-06-13 DIAGNOSIS — Z34.81 ENCOUNTER FOR SUPERVISION OF OTHER NORMAL PREGNANCY IN FIRST TRIMESTER: Primary | ICD-10-CM

## 2022-06-13 DIAGNOSIS — U07.1 COVID-19 AFFECTING PREGNANCY IN SECOND TRIMESTER: ICD-10-CM

## 2022-06-13 DIAGNOSIS — Z98.890 HISTORY OF LOOP ELECTROSURGICAL EXCISION PROCEDURE (LEEP) AFFECTING CARE OF MOTHER, ANTEPARTUM: ICD-10-CM

## 2022-06-13 DIAGNOSIS — O09.529 ANTEPARTUM MULTIGRAVIDA OF ADVANCED MATERNAL AGE: ICD-10-CM

## 2022-06-13 DIAGNOSIS — O34.40 HISTORY OF LOOP ELECTROSURGICAL EXCISION PROCEDURE (LEEP) AFFECTING CARE OF MOTHER, ANTEPARTUM: ICD-10-CM

## 2022-06-13 DIAGNOSIS — U07.1 COVID-19: Primary | ICD-10-CM

## 2022-06-13 DIAGNOSIS — O99.212 OBESITY AFFECTING PREGNANCY IN SECOND TRIMESTER: ICD-10-CM

## 2022-06-13 DIAGNOSIS — O98.512 COVID-19 AFFECTING PREGNANCY IN SECOND TRIMESTER: ICD-10-CM

## 2022-06-13 DIAGNOSIS — Z3A.17 17 WEEKS GESTATION OF PREGNANCY: ICD-10-CM

## 2022-06-13 PROCEDURE — 3074F SYST BP LT 130 MM HG: CPT | Performed by: OBSTETRICS & GYNECOLOGY

## 2022-06-13 PROCEDURE — 81002 URINALYSIS NONAUTO W/O SCOPE: CPT | Performed by: OBSTETRICS & GYNECOLOGY

## 2022-06-13 PROCEDURE — 3078F DIAST BP <80 MM HG: CPT | Performed by: NURSE PRACTITIONER

## 2022-06-13 PROCEDURE — 3078F DIAST BP <80 MM HG: CPT | Performed by: OBSTETRICS & GYNECOLOGY

## 2022-06-13 PROCEDURE — 3074F SYST BP LT 130 MM HG: CPT | Performed by: NURSE PRACTITIONER

## 2022-06-13 PROCEDURE — 3008F BODY MASS INDEX DOCD: CPT | Performed by: OBSTETRICS & GYNECOLOGY

## 2022-06-13 PROCEDURE — M0222 INTRAVENOUS INJECTION, BEBTELOVIMAB, INCLUDES INJECTION AND POST ADMINISTRATIVE MONITORING: HCPCS | Performed by: NURSE PRACTITIONER

## 2022-06-13 RX ORDER — BEBTELOVIMAB 87.5 MG/ML
175 INJECTION, SOLUTION INTRAVENOUS ONCE
Status: COMPLETED | OUTPATIENT
Start: 2022-06-13 | End: 2022-06-13

## 2022-06-13 NOTE — PROGRESS NOTES
40year old [id number] at 17w0d     Feels exhausted, achy. Mild cough. No SOB. Had some fevers after initial + Covid test. Getting monoclonal antibody infusion today after appointment. everyhone at home is covid+ too. Contractions: No  VB: No  LOF: No  Fetal movement: not yet    Return OB  Pre-Tatianna Care: UTD.   - taking baby ASA  - anatomy US ordered to be done 4 weeks    Covid+  - mild to mod sx, getting monoclonal antibody infusion today  - reviewed warning si/sx    Patient Active Problem List:     History of postpartum depression, currently pregnant - mood is good now. cont to monitor     Back pain affecting pregnancy, antepartum     History of loop electrosurgical excision procedure (LEEP) affecting care of mother, antepartum     Antepartum multigravida of advanced maternal age - taking baby ASA, plan for third tri EFW and weekly NST @ 36 weks.      Obesity affecting pregnancy, antepartum     Asthma affecting pregnancy, antepartum      - Return to clinic in: 4 weeks    Giovana Anthony,

## 2022-06-13 NOTE — ED INITIAL ASSESSMENT (HPI)
Pt here for Monoclonal infusion, pt states she tested +covid 6/7/22 pt also states she is 16 weeks pregnant, pt was instructed by her primary md to get the monoclonal therapy

## 2022-06-14 ENCOUNTER — PATIENT MESSAGE (OUTPATIENT)
Dept: OBGYN CLINIC | Facility: CLINIC | Age: 42
End: 2022-06-14

## 2022-06-14 NOTE — TELEPHONE ENCOUNTER
From: Lisseth Grimes  To: Chad Hawkins MD  Sent: 6/14/2022 2:16 AM CDT  Subject: Pharmacy switched my prescription     I tried to fill Diclegis and the pharmacy filled this without asking about a substitute. Doxylamine/Pyridoxine. Why would they do that? Is this the same? They didn't even have enough for a full order. I did not take this tonight and was out of Diclegis so that should be fun in the morning. Can you let me know if this is safe to take? Can you also send in a new script for Diclegis with no substitute listed? My insurance covers it. So I will get the fill refill soon or right away depending if this one is ok or not. Thanks. I will call the office in the afternoon if I don't hear back via [employer].

## 2022-07-05 ENCOUNTER — PATIENT MESSAGE (OUTPATIENT)
Dept: OBGYN CLINIC | Facility: CLINIC | Age: 42
End: 2022-07-05

## 2022-07-06 ENCOUNTER — ULTRASOUND ENCOUNTER (OUTPATIENT)
Dept: OBGYN CLINIC | Facility: CLINIC | Age: 42
End: 2022-07-06
Payer: COMMERCIAL

## 2022-07-06 DIAGNOSIS — Z34.81 ENCOUNTER FOR SUPERVISION OF OTHER NORMAL PREGNANCY IN FIRST TRIMESTER: ICD-10-CM

## 2022-07-06 NOTE — TELEPHONE ENCOUNTER
From: Saint Sloan  To: Kacey Gagnon MD  Sent: 7/5/2022 5:02 PM CDT  Subject: Anatomy scan prep    Is there anything I need to do beforehand like I need to not eat an hour before or full bladder or something? I thought I remembered something from last pregnancy but not sure if it was this one. Thanks!

## 2022-07-11 ENCOUNTER — ROUTINE PRENATAL (OUTPATIENT)
Dept: OBGYN CLINIC | Facility: CLINIC | Age: 42
End: 2022-07-11
Payer: COMMERCIAL

## 2022-07-11 VITALS
SYSTOLIC BLOOD PRESSURE: 122 MMHG | WEIGHT: 211 LBS | HEIGHT: 64 IN | DIASTOLIC BLOOD PRESSURE: 72 MMHG | BODY MASS INDEX: 36.02 KG/M2

## 2022-07-11 DIAGNOSIS — Z36.9 UNSPECIFIED ANTENATAL SCREENING: Primary | ICD-10-CM

## 2022-07-11 NOTE — PROGRESS NOTES
40year old [id number] at 21w0d     Contractions: No  VB: No  LOF: No  Fetal movement: positive     Return OB  Pre-Tatianna Care: UTD. Glucola, cbc next visit. - taking baby ASA    Covid+ - recovered. Repeat growth US 32 weeks  Patient Active Problem List:     History of postpartum depression, currently pregnant - mood is good now. cont to monitor     Back pain affecting pregnancy, antepartum     History of loop electrosurgical excision procedure (LEEP) affecting care of mother, antepartum - normal second tri cervical length     Antepartum multigravida of advanced maternal age - taking baby ASA, plan for third tri EFW and weekly NST @ 36 weks.      Obesity affecting pregnancy, antepartum     Asthma affecting pregnancy, antepartum      - Return to clinic in: 4 weeks    Rain Rashid MD

## 2022-07-12 ENCOUNTER — TELEPHONE (OUTPATIENT)
Dept: PHYSICAL THERAPY | Facility: HOSPITAL | Age: 42
End: 2022-07-12

## 2022-08-05 ENCOUNTER — TELEPHONE (OUTPATIENT)
Dept: PHYSICAL THERAPY | Facility: HOSPITAL | Age: 42
End: 2022-08-05

## 2022-08-08 ENCOUNTER — OFFICE VISIT (OUTPATIENT)
Dept: PHYSICAL THERAPY | Facility: HOSPITAL | Age: 42
End: 2022-08-08
Attending: OBSTETRICS & GYNECOLOGY
Payer: COMMERCIAL

## 2022-08-08 DIAGNOSIS — M54.9 BACK PAIN AFFECTING PREGNANCY, ANTEPARTUM: ICD-10-CM

## 2022-08-08 DIAGNOSIS — O99.891 BACK PAIN AFFECTING PREGNANCY, ANTEPARTUM: ICD-10-CM

## 2022-08-08 PROCEDURE — 97110 THERAPEUTIC EXERCISES: CPT

## 2022-08-08 PROCEDURE — 97162 PT EVAL MOD COMPLEX 30 MIN: CPT

## 2022-08-09 ENCOUNTER — ROUTINE PRENATAL (OUTPATIENT)
Dept: OBGYN CLINIC | Facility: CLINIC | Age: 42
End: 2022-08-09
Payer: COMMERCIAL

## 2022-08-09 VITALS
WEIGHT: 214 LBS | SYSTOLIC BLOOD PRESSURE: 120 MMHG | DIASTOLIC BLOOD PRESSURE: 72 MMHG | BODY MASS INDEX: 36.54 KG/M2 | HEIGHT: 64 IN

## 2022-08-09 DIAGNOSIS — O09.529 ANTEPARTUM MULTIGRAVIDA OF ADVANCED MATERNAL AGE: ICD-10-CM

## 2022-08-09 DIAGNOSIS — Z34.82 ENCOUNTER FOR SUPERVISION OF OTHER NORMAL PREGNANCY IN SECOND TRIMESTER: Primary | ICD-10-CM

## 2022-08-09 PROCEDURE — 3078F DIAST BP <80 MM HG: CPT | Performed by: OBSTETRICS & GYNECOLOGY

## 2022-08-09 PROCEDURE — 3074F SYST BP LT 130 MM HG: CPT | Performed by: OBSTETRICS & GYNECOLOGY

## 2022-08-09 PROCEDURE — 3008F BODY MASS INDEX DOCD: CPT | Performed by: OBSTETRICS & GYNECOLOGY

## 2022-08-09 PROCEDURE — 81002 URINALYSIS NONAUTO W/O SCOPE: CPT | Performed by: OBSTETRICS & GYNECOLOGY

## 2022-08-09 NOTE — PROGRESS NOTES
40year old [id number] at 25w1d     Contractions: No  VB: No  LOF: No  Fetal movement: positive     Return OB  Pre-Tatianna Care: UTD. Glucola, cbc next week  - taking baby ASA    Covid+ - recovered. Repeat growth US 32 weeks  Patient Active Problem List:     History of postpartum depression, currently pregnant - mood is good now. cont to monitor     Back pain affecting pregnancy, antepartum     History of loop electrosurgical excision procedure (LEEP) affecting care of mother, antepartum - normal second tri cervical length     Antepartum multigravida of advanced maternal age - taking baby ASA, plan for third tri EFW and weekly NST @ 36 weks.      Obesity affecting pregnancy, antepartum     Asthma affecting pregnancy, antepartum      - Return to clinic in: 4 weeks    Bruno Meier DO

## 2022-08-15 ENCOUNTER — OFFICE VISIT (OUTPATIENT)
Dept: PHYSICAL THERAPY | Facility: HOSPITAL | Age: 42
End: 2022-08-15
Attending: OBSTETRICS & GYNECOLOGY
Payer: COMMERCIAL

## 2022-08-15 PROCEDURE — 97140 MANUAL THERAPY 1/> REGIONS: CPT

## 2022-08-15 PROCEDURE — 97112 NEUROMUSCULAR REEDUCATION: CPT

## 2022-08-15 PROCEDURE — 97530 THERAPEUTIC ACTIVITIES: CPT

## 2022-08-17 ENCOUNTER — APPOINTMENT (OUTPATIENT)
Dept: PHYSICAL THERAPY | Facility: HOSPITAL | Age: 42
End: 2022-08-17
Attending: OBSTETRICS & GYNECOLOGY
Payer: COMMERCIAL

## 2022-08-22 ENCOUNTER — OFFICE VISIT (OUTPATIENT)
Dept: PHYSICAL THERAPY | Facility: HOSPITAL | Age: 42
End: 2022-08-22
Attending: OBSTETRICS & GYNECOLOGY
Payer: COMMERCIAL

## 2022-08-22 PROCEDURE — 97140 MANUAL THERAPY 1/> REGIONS: CPT

## 2022-08-22 PROCEDURE — 97530 THERAPEUTIC ACTIVITIES: CPT

## 2022-08-22 PROCEDURE — 97110 THERAPEUTIC EXERCISES: CPT

## 2022-08-24 ENCOUNTER — OFFICE VISIT (OUTPATIENT)
Dept: PHYSICAL THERAPY | Facility: HOSPITAL | Age: 42
End: 2022-08-24
Attending: OBSTETRICS & GYNECOLOGY
Payer: COMMERCIAL

## 2022-08-24 PROCEDURE — 97140 MANUAL THERAPY 1/> REGIONS: CPT

## 2022-08-24 PROCEDURE — 97110 THERAPEUTIC EXERCISES: CPT

## 2022-08-24 PROCEDURE — 97112 NEUROMUSCULAR REEDUCATION: CPT

## 2022-08-26 ENCOUNTER — PATIENT MESSAGE (OUTPATIENT)
Dept: OBGYN CLINIC | Facility: CLINIC | Age: 42
End: 2022-08-26

## 2022-08-26 ENCOUNTER — LAB ENCOUNTER (OUTPATIENT)
Dept: LAB | Facility: REFERENCE LAB | Age: 42
End: 2022-08-26
Attending: OBSTETRICS & GYNECOLOGY
Payer: COMMERCIAL

## 2022-08-26 LAB
DEPRECATED RDW RBC AUTO: 43.2 FL (ref 35.1–46.3)
ERYTHROCYTE [DISTWIDTH] IN BLOOD BY AUTOMATED COUNT: 13.1 % (ref 11–15)
GLUCOSE 1H P GLC SERPL-MCNC: 166 MG/DL
HCT VFR BLD AUTO: 36.5 %
HGB BLD-MCNC: 12 G/DL
MCH RBC QN AUTO: 29.6 PG (ref 26–34)
MCHC RBC AUTO-ENTMCNC: 32.9 G/DL (ref 31–37)
MCV RBC AUTO: 90.1 FL
PLATELET # BLD AUTO: 198 10(3)UL (ref 150–450)
RBC # BLD AUTO: 4.05 X10(6)UL
WBC # BLD AUTO: 8.2 X10(3) UL (ref 4–11)

## 2022-08-26 PROCEDURE — 36415 COLL VENOUS BLD VENIPUNCTURE: CPT | Performed by: OBSTETRICS & GYNECOLOGY

## 2022-08-26 PROCEDURE — 85027 COMPLETE CBC AUTOMATED: CPT | Performed by: OBSTETRICS & GYNECOLOGY

## 2022-08-26 PROCEDURE — 82950 GLUCOSE TEST: CPT | Performed by: OBSTETRICS & GYNECOLOGY

## 2022-08-26 NOTE — TELEPHONE ENCOUNTER
From: Britton Whitley  To: Janet Diana DO  Sent: 8/26/2022 12:15 PM CDT  Subject: Question regarding CBC, PLATELET; NO DIFFERENTIAL    This was supposed to be a glucose test today, not sure why they ran CBC, platelet. I don't see the glucose results, do they run that test separately? Or is it on here and I am not seeing it.

## 2022-08-29 ENCOUNTER — OFFICE VISIT (OUTPATIENT)
Dept: PHYSICAL THERAPY | Facility: HOSPITAL | Age: 42
End: 2022-08-29
Attending: OBSTETRICS & GYNECOLOGY
Payer: COMMERCIAL

## 2022-08-29 PROCEDURE — 97110 THERAPEUTIC EXERCISES: CPT

## 2022-08-29 PROCEDURE — 97140 MANUAL THERAPY 1/> REGIONS: CPT

## 2022-08-29 PROCEDURE — 97112 NEUROMUSCULAR REEDUCATION: CPT

## 2022-08-30 ENCOUNTER — ROUTINE PRENATAL (OUTPATIENT)
Dept: OBGYN CLINIC | Facility: CLINIC | Age: 42
End: 2022-08-30
Payer: COMMERCIAL

## 2022-08-30 VITALS
SYSTOLIC BLOOD PRESSURE: 112 MMHG | BODY MASS INDEX: 37.56 KG/M2 | HEIGHT: 64 IN | DIASTOLIC BLOOD PRESSURE: 70 MMHG | WEIGHT: 220 LBS

## 2022-08-30 DIAGNOSIS — Z34.82 ENCOUNTER FOR SUPERVISION OF OTHER NORMAL PREGNANCY IN SECOND TRIMESTER: Primary | ICD-10-CM

## 2022-08-30 DIAGNOSIS — O09.529 ANTEPARTUM MULTIGRAVIDA OF ADVANCED MATERNAL AGE: ICD-10-CM

## 2022-08-30 DIAGNOSIS — R73.09 ABNORMAL GTT (GLUCOSE TOLERANCE TEST): Primary | ICD-10-CM

## 2022-08-30 PROCEDURE — 90715 TDAP VACCINE 7 YRS/> IM: CPT | Performed by: OBSTETRICS & GYNECOLOGY

## 2022-08-30 PROCEDURE — 3008F BODY MASS INDEX DOCD: CPT | Performed by: OBSTETRICS & GYNECOLOGY

## 2022-08-30 PROCEDURE — 90471 IMMUNIZATION ADMIN: CPT | Performed by: OBSTETRICS & GYNECOLOGY

## 2022-08-30 PROCEDURE — 3078F DIAST BP <80 MM HG: CPT | Performed by: OBSTETRICS & GYNECOLOGY

## 2022-08-30 PROCEDURE — 3074F SYST BP LT 130 MM HG: CPT | Performed by: OBSTETRICS & GYNECOLOGY

## 2022-08-30 PROCEDURE — 81002 URINALYSIS NONAUTO W/O SCOPE: CPT | Performed by: OBSTETRICS & GYNECOLOGY

## 2022-08-30 RX ORDER — ASPIRIN 81 MG/1
162 TABLET, COATED ORAL DAILY
COMMUNITY
Start: 2022-08-04 | End: 2022-08-30

## 2022-08-30 RX ORDER — ERGOCALCIFEROL (VITAMIN D2) 50 MCG
1 CAPSULE ORAL AS DIRECTED
COMMUNITY

## 2022-08-30 RX ORDER — ALBUTEROL SULFATE 90 UG/1
AEROSOL, METERED RESPIRATORY (INHALATION) EVERY 6 HOURS PRN
COMMUNITY

## 2022-08-31 ENCOUNTER — APPOINTMENT (OUTPATIENT)
Dept: PHYSICAL THERAPY | Facility: HOSPITAL | Age: 42
End: 2022-08-31
Attending: OBSTETRICS & GYNECOLOGY
Payer: COMMERCIAL

## 2022-09-02 ENCOUNTER — LABORATORY ENCOUNTER (OUTPATIENT)
Dept: LAB | Age: 42
End: 2022-09-02
Attending: OBSTETRICS & GYNECOLOGY
Payer: COMMERCIAL

## 2022-09-02 DIAGNOSIS — R73.09 ABNORMAL GTT (GLUCOSE TOLERANCE TEST): Primary | ICD-10-CM

## 2022-09-02 LAB
GLUCOSE 1H P GLC SERPL-MCNC: 184 MG/DL
GLUCOSE 2H P GLC SERPL-MCNC: 138 MG/DL
GLUCOSE 3H P GLC SERPL-MCNC: 93 MG/DL (ref 70–140)
GLUCOSE P FAST SERPL-MCNC: 86 MG/DL

## 2022-09-02 PROCEDURE — 36415 COLL VENOUS BLD VENIPUNCTURE: CPT

## 2022-09-02 PROCEDURE — 82951 GLUCOSE TOLERANCE TEST (GTT): CPT

## 2022-09-02 PROCEDURE — 82952 GTT-ADDED SAMPLES: CPT

## 2022-09-07 ENCOUNTER — OFFICE VISIT (OUTPATIENT)
Dept: PHYSICAL THERAPY | Facility: HOSPITAL | Age: 42
End: 2022-09-07
Attending: OBSTETRICS & GYNECOLOGY
Payer: COMMERCIAL

## 2022-09-07 PROCEDURE — 97140 MANUAL THERAPY 1/> REGIONS: CPT

## 2022-09-07 PROCEDURE — 97110 THERAPEUTIC EXERCISES: CPT

## 2022-09-07 RX ORDER — DOXYLAMINE SUCCINATE AND PYRIDOXINE HYDROCHLORIDE 10; 10 MG/1; MG/1
1 TABLET, DELAYED RELEASE ORAL 4 TIMES DAILY
Qty: 120 TABLET | Refills: 3 | Status: SHIPPED | OUTPATIENT
Start: 2022-09-07

## 2022-09-12 ENCOUNTER — OFFICE VISIT (OUTPATIENT)
Dept: PHYSICAL THERAPY | Facility: HOSPITAL | Age: 42
End: 2022-09-12
Attending: FAMILY MEDICINE
Payer: COMMERCIAL

## 2022-09-12 PROCEDURE — 97140 MANUAL THERAPY 1/> REGIONS: CPT

## 2022-09-12 PROCEDURE — 97110 THERAPEUTIC EXERCISES: CPT

## 2022-09-15 ENCOUNTER — ROUTINE PRENATAL (OUTPATIENT)
Dept: OBGYN CLINIC | Facility: CLINIC | Age: 42
End: 2022-09-15
Payer: COMMERCIAL

## 2022-09-15 VITALS
BODY MASS INDEX: 37.73 KG/M2 | HEIGHT: 64 IN | WEIGHT: 221 LBS | SYSTOLIC BLOOD PRESSURE: 122 MMHG | DIASTOLIC BLOOD PRESSURE: 72 MMHG

## 2022-09-15 DIAGNOSIS — Z34.83 ENCOUNTER FOR SUPERVISION OF OTHER NORMAL PREGNANCY IN THIRD TRIMESTER: Primary | ICD-10-CM

## 2022-09-15 DIAGNOSIS — Z36.9 UNSPECIFIED ANTENATAL SCREENING: ICD-10-CM

## 2022-09-15 DIAGNOSIS — O99.210 OBESITY AFFECTING PREGNANCY, ANTEPARTUM: ICD-10-CM

## 2022-09-15 DIAGNOSIS — O09.529 ANTEPARTUM MULTIGRAVIDA OF ADVANCED MATERNAL AGE: ICD-10-CM

## 2022-09-15 PROCEDURE — 81002 URINALYSIS NONAUTO W/O SCOPE: CPT | Performed by: OBSTETRICS & GYNECOLOGY

## 2022-09-15 PROCEDURE — 3074F SYST BP LT 130 MM HG: CPT | Performed by: OBSTETRICS & GYNECOLOGY

## 2022-09-15 PROCEDURE — 3008F BODY MASS INDEX DOCD: CPT | Performed by: OBSTETRICS & GYNECOLOGY

## 2022-09-15 PROCEDURE — 3078F DIAST BP <80 MM HG: CPT | Performed by: OBSTETRICS & GYNECOLOGY

## 2022-09-15 NOTE — PROGRESS NOTES
Doing well. No OB complaints. +FM. US growth for AMA and BMI. KRISTIAN 2 weeks. Dr. Severa Boehringer, MD    [facility] 10 OBGYN     This note was created by [facility] voice recognition. Errors in content may be related to improper recognition by the system; efforts to review and correct have been done but errors may still exist. Please be advised the primary purpose of this note is for me to communicate medical care. Standard sentence structure is not always used. Medical terminology and medical abbreviations may be used. There may be grammatical, typographical, and automated fill ins that may have errors missed in proofreading.

## 2022-09-21 ENCOUNTER — OFFICE VISIT (OUTPATIENT)
Dept: PHYSICAL THERAPY | Facility: HOSPITAL | Age: 42
End: 2022-09-21
Attending: FAMILY MEDICINE

## 2022-09-21 PROCEDURE — 97110 THERAPEUTIC EXERCISES: CPT

## 2022-09-30 RX ORDER — DOXYLAMINE SUCCINATE AND PYRIDOXINE HYDROCHLORIDE 10; 10 MG/1; MG/1
1 TABLET, DELAYED RELEASE ORAL 4 TIMES DAILY
Qty: 120 TABLET | Refills: 3 | Status: SHIPPED | OUTPATIENT
Start: 2022-09-30

## 2022-10-06 ENCOUNTER — ROUTINE PRENATAL (OUTPATIENT)
Dept: OBGYN CLINIC | Facility: CLINIC | Age: 42
End: 2022-10-06
Payer: COMMERCIAL

## 2022-10-06 VITALS
BODY MASS INDEX: 37.8 KG/M2 | SYSTOLIC BLOOD PRESSURE: 122 MMHG | WEIGHT: 221.38 LBS | DIASTOLIC BLOOD PRESSURE: 72 MMHG | HEIGHT: 64 IN

## 2022-10-06 DIAGNOSIS — Z3A.33 33 WEEKS GESTATION OF PREGNANCY: Primary | ICD-10-CM

## 2022-10-06 PROCEDURE — 3074F SYST BP LT 130 MM HG: CPT | Performed by: OBSTETRICS & GYNECOLOGY

## 2022-10-06 PROCEDURE — 3078F DIAST BP <80 MM HG: CPT | Performed by: OBSTETRICS & GYNECOLOGY

## 2022-10-06 PROCEDURE — 3008F BODY MASS INDEX DOCD: CPT | Performed by: OBSTETRICS & GYNECOLOGY

## 2022-10-06 NOTE — PROGRESS NOTES
Denies c/o.   40year old [id number] at 33w3d by 7 wks US     Return OB  Pre- Care: UTD. Patient Active Problem List:     History of postpartum depression, currently pregnant     Back pain affecting pregnancy, antepartum     History of loop electrosurgical excision procedure (LEEP) affecting care of mother, antepartum     Antepartum multigravida of advanced maternal age     Obesity affecting pregnancy, antepartum     Asthma affecting pregnancy, antepartum     Elevated glucose tolerance test  start weekly NST 36 weeks.  Has US scheduled 10/20.     - Return to clinic in: 2

## 2022-10-13 ENCOUNTER — HOSPITAL ENCOUNTER (OUTPATIENT)
Dept: ULTRASOUND IMAGING | Age: 42
Discharge: HOME | End: 2022-10-13
Attending: OBSTETRICS & GYNECOLOGY
Payer: COMMERCIAL

## 2022-10-13 DIAGNOSIS — O99.210 OBESITY AFFECTING PREGNANCY, ANTEPARTUM: ICD-10-CM

## 2022-10-13 DIAGNOSIS — O09.529 ANTEPARTUM MULTIGRAVIDA OF ADVANCED MATERNAL AGE: ICD-10-CM

## 2022-10-13 PROCEDURE — 76816 OB US FOLLOW-UP PER FETUS: CPT | Performed by: OBSTETRICS & GYNECOLOGY

## 2022-10-19 ENCOUNTER — ROUTINE PRENATAL (OUTPATIENT)
Dept: OBGYN CLINIC | Facility: CLINIC | Age: 42
End: 2022-10-19
Payer: COMMERCIAL

## 2022-10-19 ENCOUNTER — TELEPHONE (OUTPATIENT)
Dept: OBGYN CLINIC | Facility: CLINIC | Age: 42
End: 2022-10-19

## 2022-10-19 VITALS
SYSTOLIC BLOOD PRESSURE: 122 MMHG | HEIGHT: 64 IN | WEIGHT: 223.81 LBS | BODY MASS INDEX: 38.21 KG/M2 | DIASTOLIC BLOOD PRESSURE: 70 MMHG

## 2022-10-19 DIAGNOSIS — Z36.9 UNSPECIFIED ANTENATAL SCREENING: Primary | ICD-10-CM

## 2022-10-19 DIAGNOSIS — O09.529 ANTEPARTUM MULTIGRAVIDA OF ADVANCED MATERNAL AGE: ICD-10-CM

## 2022-10-19 PROCEDURE — 81002 URINALYSIS NONAUTO W/O SCOPE: CPT | Performed by: OBSTETRICS & GYNECOLOGY

## 2022-10-19 PROCEDURE — 3008F BODY MASS INDEX DOCD: CPT | Performed by: OBSTETRICS & GYNECOLOGY

## 2022-10-19 PROCEDURE — 3078F DIAST BP <80 MM HG: CPT | Performed by: OBSTETRICS & GYNECOLOGY

## 2022-10-19 PROCEDURE — 3074F SYST BP LT 130 MM HG: CPT | Performed by: OBSTETRICS & GYNECOLOGY

## 2022-10-19 NOTE — PROGRESS NOTES
Denies c/o.   40year old  at 35w2d by 7 wks US     Return OB  Pre- Care: UTD. GBS, hiv trep next visit  Patient Active Problem List:     History of postpartum depression, currently pregnant     Back pain affecting pregnancy, antepartum     History of loop electrosurgical excision procedure (LEEP) affecting care of mother, antepartum     Antepartum multigravida of advanced maternal age     Obesity affecting pregnancy, antepartum     Asthma affecting pregnancy, antepartum     Elevated glucose tolerance test  start weekly NST 36 weeks.  Has US, 10/13 - Estimated fetal weight 2443 g, standard deviation 366 g, 46.5 percentile     - Return to clinic in: 1

## 2022-10-19 NOTE — TELEPHONE ENCOUNTER
Pt scheduled for induction 11/16 at 7 am. Covid order placed. Mychart msg sent.       ----- Message from Trinidad Holguin MD sent at 10/19/2022 11:46 AM CDT -----  Regarding: RE: schedule surgery  Patient attached  ----- Message -----  From: Ricarda Closs  Sent: 10/19/2022  11:18 AM CDT  To: Trinidad Holguin MD, #  Subject: RE: schedule surgery                             Hi Dr. Caro Stallings,    Please attach the patient. Thanks,  Suellen Beltran  ----- Message -----  From: Boris Reynolds MD  Sent: 10/19/2022  11:16 AM CDT  To: [place] [id number] [facility] [place]  Subject: schedule surgery                                   Please schedule the following induction:    Procedure: Induction of labor  Date:                                    11/16/22, first available  Dx: AMA, obesity in pregnancy  Admission type: inpt   Department of discharge(SDS/Floor): L&D  Expected length of stay: 2-4      Message to nurses: will place covid test orders    Thanks!   Dr. Caro Stallings

## 2022-10-26 ENCOUNTER — ROUTINE PRENATAL (OUTPATIENT)
Dept: OBGYN CLINIC | Facility: CLINIC | Age: 42
End: 2022-10-26
Payer: COMMERCIAL

## 2022-10-26 VITALS
HEIGHT: 64 IN | SYSTOLIC BLOOD PRESSURE: 122 MMHG | WEIGHT: 224.38 LBS | DIASTOLIC BLOOD PRESSURE: 64 MMHG | BODY MASS INDEX: 38.31 KG/M2

## 2022-10-26 DIAGNOSIS — Z36.9 UNSPECIFIED ANTENATAL SCREENING: ICD-10-CM

## 2022-10-26 DIAGNOSIS — Z34.83 ENCOUNTER FOR SUPERVISION OF OTHER NORMAL PREGNANCY IN THIRD TRIMESTER: Primary | ICD-10-CM

## 2022-10-26 DIAGNOSIS — O09.529 ANTEPARTUM MULTIGRAVIDA OF ADVANCED MATERNAL AGE: ICD-10-CM

## 2022-10-26 PROCEDURE — 3074F SYST BP LT 130 MM HG: CPT | Performed by: OBSTETRICS & GYNECOLOGY

## 2022-10-26 PROCEDURE — 87081 CULTURE SCREEN ONLY: CPT | Performed by: OBSTETRICS & GYNECOLOGY

## 2022-10-26 PROCEDURE — 81002 URINALYSIS NONAUTO W/O SCOPE: CPT | Performed by: OBSTETRICS & GYNECOLOGY

## 2022-10-26 PROCEDURE — 3078F DIAST BP <80 MM HG: CPT | Performed by: OBSTETRICS & GYNECOLOGY

## 2022-10-26 PROCEDURE — 3008F BODY MASS INDEX DOCD: CPT | Performed by: OBSTETRICS & GYNECOLOGY

## 2022-10-26 PROCEDURE — 87653 STREP B DNA AMP PROBE: CPT | Performed by: OBSTETRICS & GYNECOLOGY

## 2022-10-26 PROCEDURE — 59025 FETAL NON-STRESS TEST: CPT | Performed by: OBSTETRICS & GYNECOLOGY

## 2022-10-26 NOTE — PROGRESS NOTES
Denies c/o.   40year old  at 36w2d by 7 wks US     Return OB  Pre- Care: UTD. GBS, hiv trep ordered    Patient Active Problem List:     History of postpartum depression, currently pregnant     Back pain affecting pregnancy, antepartum     History of loop electrosurgical excision procedure (LEEP) affecting care of mother, antepartum     Antepartum multigravida of advanced maternal age     Obesity affecting pregnancy, antepartum     Asthma affecting pregnancy, antepartum     Elevated glucose tolerance test  continue weekly NST.  Has US, 10/13 - Estimated fetal weight 2443 g, standard deviation 366 g, 46.5 percentile     - Return to clinic in: 1 wk

## 2022-10-28 LAB — GROUP B STREP BY PCR FOR PCR OVT: NEGATIVE

## 2022-11-02 ENCOUNTER — ROUTINE PRENATAL (OUTPATIENT)
Dept: OBGYN CLINIC | Facility: CLINIC | Age: 42
End: 2022-11-02
Payer: COMMERCIAL

## 2022-11-02 ENCOUNTER — LAB ENCOUNTER (OUTPATIENT)
Dept: LAB | Age: 42
End: 2022-11-02
Attending: OBSTETRICS & GYNECOLOGY
Payer: COMMERCIAL

## 2022-11-02 VITALS
HEIGHT: 64 IN | WEIGHT: 226.19 LBS | SYSTOLIC BLOOD PRESSURE: 106 MMHG | BODY MASS INDEX: 38.62 KG/M2 | DIASTOLIC BLOOD PRESSURE: 66 MMHG

## 2022-11-02 DIAGNOSIS — Z86.59 HISTORY OF POSTPARTUM DEPRESSION: ICD-10-CM

## 2022-11-02 DIAGNOSIS — Z87.59 HISTORY OF POSTPARTUM DEPRESSION: ICD-10-CM

## 2022-11-02 DIAGNOSIS — Z36.9 UNSPECIFIED ANTENATAL SCREENING: Primary | ICD-10-CM

## 2022-11-02 LAB
BASOPHILS # BLD AUTO: 0.02 X10(3) UL (ref 0–0.2)
BASOPHILS NFR BLD AUTO: 0.3 %
DEPRECATED RDW RBC AUTO: 41.2 FL (ref 35.1–46.3)
EOSINOPHIL # BLD AUTO: 0.07 X10(3) UL (ref 0–0.7)
EOSINOPHIL NFR BLD AUTO: 0.9 %
ERYTHROCYTE [DISTWIDTH] IN BLOOD BY AUTOMATED COUNT: 12.8 % (ref 11–15)
GLUCOSE (URINE DIPSTICK): NEGATIVE MG/DL
HCT VFR BLD AUTO: 37.4 %
HGB BLD-MCNC: 12.4 G/DL
IMM GRANULOCYTES # BLD AUTO: 0.01 X10(3) UL (ref 0–1)
IMM GRANULOCYTES NFR BLD: 0.1 %
LYMPHOCYTES # BLD AUTO: 1.46 X10(3) UL (ref 1–4)
LYMPHOCYTES NFR BLD AUTO: 19.7 %
MCH RBC QN AUTO: 29.2 PG (ref 26–34)
MCHC RBC AUTO-ENTMCNC: 33.2 G/DL (ref 31–37)
MCV RBC AUTO: 88.2 FL
MONOCYTES # BLD AUTO: 0.66 X10(3) UL (ref 0.1–1)
MONOCYTES NFR BLD AUTO: 8.9 %
MULTISTIX LOT#: NORMAL NUMERIC
NEUTROPHILS # BLD AUTO: 5.21 X10 (3) UL (ref 1.5–7.7)
NEUTROPHILS # BLD AUTO: 5.21 X10(3) UL (ref 1.5–7.7)
NEUTROPHILS NFR BLD AUTO: 70.1 %
PLATELET # BLD AUTO: 200 10(3)UL (ref 150–450)
RBC # BLD AUTO: 4.24 X10(6)UL
WBC # BLD AUTO: 7.4 X10(3) UL (ref 4–11)

## 2022-11-02 PROCEDURE — 36415 COLL VENOUS BLD VENIPUNCTURE: CPT | Performed by: OBSTETRICS & GYNECOLOGY

## 2022-11-02 PROCEDURE — 3008F BODY MASS INDEX DOCD: CPT | Performed by: OBSTETRICS & GYNECOLOGY

## 2022-11-02 PROCEDURE — 87389 HIV-1 AG W/HIV-1&-2 AB AG IA: CPT | Performed by: OBSTETRICS & GYNECOLOGY

## 2022-11-02 PROCEDURE — 86780 TREPONEMA PALLIDUM: CPT | Performed by: OBSTETRICS & GYNECOLOGY

## 2022-11-02 PROCEDURE — 59025 FETAL NON-STRESS TEST: CPT | Performed by: OBSTETRICS & GYNECOLOGY

## 2022-11-02 PROCEDURE — 3074F SYST BP LT 130 MM HG: CPT | Performed by: OBSTETRICS & GYNECOLOGY

## 2022-11-02 PROCEDURE — 85025 COMPLETE CBC W/AUTO DIFF WBC: CPT | Performed by: OBSTETRICS & GYNECOLOGY

## 2022-11-02 PROCEDURE — 3078F DIAST BP <80 MM HG: CPT | Performed by: OBSTETRICS & GYNECOLOGY

## 2022-11-02 PROCEDURE — 81002 URINALYSIS NONAUTO W/O SCOPE: CPT | Performed by: OBSTETRICS & GYNECOLOGY

## 2022-11-02 RX ORDER — SERTRALINE HYDROCHLORIDE 25 MG/1
25 TABLET, FILM COATED ORAL DAILY
Qty: 90 TABLET | Refills: 0 | Status: SHIPPED | OUTPATIENT
Start: 2022-11-02 | End: 2023-01-31

## 2022-11-02 NOTE — PROGRESS NOTES
Doing well. No OB complaints. +FM. No regular cramping or contractions. NST reactive. IOL scheduled for 11/16 for AMA > age 36. Noted a history of PP depression. Desires therapy referral. [facility] ordered. Never taken antidepressants but noted she desires the option to start if she would like. Discussed use of Zoloft and low risk with pregnancy and lactation. Pt understood and agreed. E-prescribed Zoloft 25 mg daily for 90 day supply. Desires Flu vaccine next week. Also desires COVID booster. Rec [facility] for COVID booster. KRISTIAN 1 week     Dr. Ricardo Das MD    [facility] 10 OBGYN     This note was created by [employer] voice recognition. Errors in content may be related to improper recognition by the system; efforts to review and correct have been done but errors may still exist. Please be advised the primary purpose of this note is for me to communicate medical care. Standard sentence structure is not always used. Medical terminology and medical abbreviations may be used. There may be grammatical, typographical, and automated fill ins that may have errors missed in proofreading.

## 2022-11-04 LAB — T PALLIDUM AB SER QL: NEGATIVE

## 2022-11-08 ENCOUNTER — ROUTINE PRENATAL (OUTPATIENT)
Dept: OBGYN CLINIC | Facility: CLINIC | Age: 42
End: 2022-11-08
Payer: COMMERCIAL

## 2022-11-08 VITALS
BODY MASS INDEX: 38.55 KG/M2 | DIASTOLIC BLOOD PRESSURE: 64 MMHG | HEIGHT: 64 IN | WEIGHT: 225.81 LBS | SYSTOLIC BLOOD PRESSURE: 108 MMHG

## 2022-11-08 DIAGNOSIS — O09.529 ANTEPARTUM MULTIGRAVIDA OF ADVANCED MATERNAL AGE: ICD-10-CM

## 2022-11-08 DIAGNOSIS — Z36.9 UNSPECIFIED ANTENATAL SCREENING: Primary | ICD-10-CM

## 2022-11-08 PROCEDURE — 3008F BODY MASS INDEX DOCD: CPT | Performed by: OBSTETRICS & GYNECOLOGY

## 2022-11-08 PROCEDURE — 3078F DIAST BP <80 MM HG: CPT | Performed by: OBSTETRICS & GYNECOLOGY

## 2022-11-08 PROCEDURE — 3074F SYST BP LT 130 MM HG: CPT | Performed by: OBSTETRICS & GYNECOLOGY

## 2022-11-08 PROCEDURE — 59025 FETAL NON-STRESS TEST: CPT | Performed by: OBSTETRICS & GYNECOLOGY

## 2022-11-08 PROCEDURE — 81002 URINALYSIS NONAUTO W/O SCOPE: CPT | Performed by: OBSTETRICS & GYNECOLOGY

## 2022-11-08 NOTE — PROGRESS NOTES
Denies c/o.   40year old [id number] at 38w1d by 7 wks US     Return OB  Pre- Care: UTD. GBS negative   Patient Active Problem List:     History of postpartum depression, currently pregnant     Back pain affecting pregnancy, antepartum     History of loop electrosurgical excision procedure (LEEP) affecting care of mother, antepartum     Antepartum multigravida of advanced maternal age     Obesity affecting pregnancy, antepartum     Asthma affecting pregnancy, antepartum     Elevated glucose tolerance test  continue weekly NST. Has US, 10/13 - Estimated fetal weight 2443 g, standard deviation 366 g, 46.5 percentile   Has resources for mental health. Was given Prescription for Zoloft.  Hsa not started    IOL scheduled for  for AMA > age 36.     - Return to clinic in: 1 wk

## 2022-11-13 ENCOUNTER — LAB ENCOUNTER (OUTPATIENT)
Dept: LAB | Facility: HOSPITAL | Age: 42
End: 2022-11-13
Attending: OBSTETRICS & GYNECOLOGY
Payer: COMMERCIAL

## 2022-11-13 DIAGNOSIS — Z36.9 UNSPECIFIED ANTENATAL SCREENING: ICD-10-CM

## 2022-11-13 DIAGNOSIS — O09.529 ANTEPARTUM MULTIGRAVIDA OF ADVANCED MATERNAL AGE: ICD-10-CM

## 2022-11-14 LAB — SARS-COV-2 RNA RESP QL NAA+PROBE: NOT DETECTED

## 2022-11-15 ENCOUNTER — ROUTINE PRENATAL (OUTPATIENT)
Dept: OBGYN CLINIC | Facility: CLINIC | Age: 42
End: 2022-11-15
Payer: COMMERCIAL

## 2022-11-15 VITALS
SYSTOLIC BLOOD PRESSURE: 110 MMHG | HEIGHT: 64 IN | BODY MASS INDEX: 38.96 KG/M2 | DIASTOLIC BLOOD PRESSURE: 64 MMHG | WEIGHT: 228.19 LBS

## 2022-11-15 DIAGNOSIS — Z36.9 UNSPECIFIED ANTENATAL SCREENING: Primary | ICD-10-CM

## 2022-11-15 DIAGNOSIS — O99.210 OBESITY AFFECTING PREGNANCY, ANTEPARTUM: ICD-10-CM

## 2022-11-15 DIAGNOSIS — O09.529 ANTEPARTUM MULTIGRAVIDA OF ADVANCED MATERNAL AGE: ICD-10-CM

## 2022-11-15 PROCEDURE — 3078F DIAST BP <80 MM HG: CPT | Performed by: OBSTETRICS & GYNECOLOGY

## 2022-11-15 PROCEDURE — 59025 FETAL NON-STRESS TEST: CPT | Performed by: OBSTETRICS & GYNECOLOGY

## 2022-11-15 PROCEDURE — 81002 URINALYSIS NONAUTO W/O SCOPE: CPT | Performed by: OBSTETRICS & GYNECOLOGY

## 2022-11-15 PROCEDURE — 3074F SYST BP LT 130 MM HG: CPT | Performed by: OBSTETRICS & GYNECOLOGY

## 2022-11-15 PROCEDURE — 3008F BODY MASS INDEX DOCD: CPT | Performed by: OBSTETRICS & GYNECOLOGY

## 2022-11-15 NOTE — PROGRESS NOTES
Denies c/o.   40year old [id number] at 39w1d by 7 wks US     Return OB  Pre- Care: UTD. GBS negative   Patient Active Problem List:     History of postpartum depression, currently pregnant     Back pain affecting pregnancy, antepartum     History of loop electrosurgical excision procedure (LEEP) affecting care of mother, antepartum     Antepartum multigravida of advanced maternal age     Obesity affecting pregnancy, antepartum     Asthma affecting pregnancy, antepartum     Elevated glucose tolerance test  continue weekly NST. Has US, 10/13 - Estimated fetal weight 2443 g, standard deviation 366 g, 46.5 percentile   Has resources for mental health. Was given Prescription for Zoloft.  Hsa not started    IOL scheduled for  for AMA > age 36.     - Return to clinic in: 1 wk

## 2022-11-18 ENCOUNTER — PATIENT MESSAGE (OUTPATIENT)
Dept: OBGYN CLINIC | Facility: CLINIC | Age: 42
End: 2022-11-18

## 2022-11-18 ENCOUNTER — ROUTINE PRENATAL (OUTPATIENT)
Dept: OBGYN CLINIC | Facility: CLINIC | Age: 42
End: 2022-11-18
Payer: COMMERCIAL

## 2022-11-18 VITALS
WEIGHT: 226.63 LBS | DIASTOLIC BLOOD PRESSURE: 64 MMHG | HEIGHT: 64 IN | SYSTOLIC BLOOD PRESSURE: 112 MMHG | BODY MASS INDEX: 38.69 KG/M2

## 2022-11-18 DIAGNOSIS — O99.210 OBESITY AFFECTING PREGNANCY, ANTEPARTUM: ICD-10-CM

## 2022-11-18 DIAGNOSIS — O09.529 ANTEPARTUM MULTIGRAVIDA OF ADVANCED MATERNAL AGE: ICD-10-CM

## 2022-11-18 DIAGNOSIS — Z36.9 UNSPECIFIED ANTENATAL SCREENING: Primary | ICD-10-CM

## 2022-11-18 PROCEDURE — 3008F BODY MASS INDEX DOCD: CPT | Performed by: OBSTETRICS & GYNECOLOGY

## 2022-11-18 PROCEDURE — 59025 FETAL NON-STRESS TEST: CPT | Performed by: OBSTETRICS & GYNECOLOGY

## 2022-11-18 PROCEDURE — 3074F SYST BP LT 130 MM HG: CPT | Performed by: OBSTETRICS & GYNECOLOGY

## 2022-11-18 PROCEDURE — 3078F DIAST BP <80 MM HG: CPT | Performed by: OBSTETRICS & GYNECOLOGY

## 2022-11-18 NOTE — PROGRESS NOTES
Denies c/o.   40year old [id number] at 39w4d by 7 wks US     Return OB  Pre- Care: UTD. GBS negative; iol scheduled  - should be ok for pitocin  Patient Active Problem List:     History of postpartum depression, currently pregnant     Back pain affecting pregnancy, antepartum     History of loop electrosurgical excision procedure (LEEP) affecting care of mother, antepartum     Antepartum multigravida of advanced maternal age     Obesity affecting pregnancy, antepartum     Asthma affecting pregnancy, antepartum     Elevated glucose tolerance test  continue weekly NST. Has US, 10/13 - Estimated fetal weight 2443 g, standard deviation 366 g, 46.5 percentile   Has resources for mental health. Was given Prescription for Zoloft.  Has not started    IOL scheduled for  for AMA > age 36.     - Return to clinic in: 1 wk

## 2022-11-18 NOTE — TELEPHONE ENCOUNTER
RN called to inform pt that IOL was resked for 11/21 at 8am. Pt already aware. Pt verbalized understanding and agreed with POC.

## 2022-11-21 ENCOUNTER — HOSPITAL ENCOUNTER (INPATIENT)
Facility: HOSPITAL | Age: 42
LOS: 1 days | Discharge: HOME | End: 2022-11-22
Attending: OBSTETRICS & GYNECOLOGY | Admitting: OBSTETRICS & GYNECOLOGY
Payer: COMMERCIAL

## 2022-11-21 ENCOUNTER — ANESTHESIA (OUTPATIENT)
Dept: OBGYN UNIT | Facility: HOSPITAL | Age: 42
End: 2022-11-21
Payer: COMMERCIAL

## 2022-11-21 ENCOUNTER — ANESTHESIA EVENT (OUTPATIENT)
Dept: OBGYN UNIT | Facility: HOSPITAL | Age: 42
End: 2022-11-21
Payer: COMMERCIAL

## 2022-11-21 ENCOUNTER — APPOINTMENT (OUTPATIENT)
Dept: OBGYN CLINIC | Facility: HOSPITAL | Age: 42
End: 2022-11-21
Payer: COMMERCIAL

## 2022-11-21 PROBLEM — O09.523 ADVANCED MATERNAL AGE IN MULTIGRAVIDA, THIRD TRIMESTER: Status: ACTIVE | Noted: 2022-05-12

## 2022-11-21 PROBLEM — O09.523 ADVANCED MATERNAL AGE IN MULTIGRAVIDA, THIRD TRIMESTER (HCC): Status: ACTIVE | Noted: 2022-05-12

## 2022-11-21 PROBLEM — Z34.90 PREGNANT (HCC): Status: ACTIVE | Noted: 2022-11-21

## 2022-11-21 PROBLEM — Z34.90 PREGNANT: Status: ACTIVE | Noted: 2022-11-21

## 2022-11-21 LAB
ANTIBODY SCREEN: NEGATIVE
BASOPHILS # BLD AUTO: 0.03 X10(3) UL (ref 0–0.2)
BASOPHILS NFR BLD AUTO: 0.3 %
DEPRECATED RDW RBC AUTO: 42 FL (ref 35.1–46.3)
EOSINOPHIL # BLD AUTO: 0.1 X10(3) UL (ref 0–0.7)
EOSINOPHIL NFR BLD AUTO: 1 %
ERYTHROCYTE [DISTWIDTH] IN BLOOD BY AUTOMATED COUNT: 13.1 % (ref 11–15)
HCT VFR BLD AUTO: 38.1 %
HGB BLD-MCNC: 12.6 G/DL
IMM GRANULOCYTES # BLD AUTO: 0.03 X10(3) UL (ref 0–1)
IMM GRANULOCYTES NFR BLD: 0.3 %
LYMPHOCYTES # BLD AUTO: 1.85 X10(3) UL (ref 1–4)
LYMPHOCYTES NFR BLD AUTO: 18.1 %
MCH RBC QN AUTO: 29 PG (ref 26–34)
MCHC RBC AUTO-ENTMCNC: 33.1 G/DL (ref 31–37)
MCV RBC AUTO: 87.8 FL
MONOCYTES # BLD AUTO: 1.03 X10(3) UL (ref 0.1–1)
MONOCYTES NFR BLD AUTO: 10.1 %
NEUTROPHILS # BLD AUTO: 7.17 X10 (3) UL (ref 1.5–7.7)
NEUTROPHILS # BLD AUTO: 7.17 X10(3) UL (ref 1.5–7.7)
NEUTROPHILS NFR BLD AUTO: 70.2 %
PLATELET # BLD AUTO: 199 10(3)UL (ref 150–450)
RBC # BLD AUTO: 4.34 X10(6)UL
RH BLOOD TYPE: POSITIVE
SARS-COV-2 RNA RESP QL NAA+PROBE: NOT DETECTED
WBC # BLD AUTO: 10.2 X10(3) UL (ref 4–11)

## 2022-11-21 PROCEDURE — 0KQM0ZZ REPAIR PERINEUM MUSCLE, OPEN APPROACH: ICD-10-PCS | Performed by: OBSTETRICS & GYNECOLOGY

## 2022-11-21 PROCEDURE — 59400 OBSTETRICAL CARE: CPT | Performed by: OBSTETRICS & GYNECOLOGY

## 2022-11-21 RX ORDER — ONDANSETRON 2 MG/ML
4 INJECTION INTRAMUSCULAR; INTRAVENOUS EVERY 6 HOURS PRN
Status: DISCONTINUED | OUTPATIENT
Start: 2022-11-21 | End: 2022-11-21 | Stop reason: HOSPADM

## 2022-11-21 RX ORDER — AMMONIA INHALANTS 0.04 G/.3ML
0.3 INHALANT RESPIRATORY (INHALATION) AS NEEDED
Status: DISCONTINUED | OUTPATIENT
Start: 2022-11-21 | End: 2022-11-22

## 2022-11-21 RX ORDER — MISOPROSTOL 200 UG/1
TABLET ORAL
Status: COMPLETED
Start: 2022-11-21 | End: 2022-11-21

## 2022-11-21 RX ORDER — SODIUM CHLORIDE, SODIUM LACTATE, POTASSIUM CHLORIDE, CALCIUM CHLORIDE 600; 310; 30; 20 MG/100ML; MG/100ML; MG/100ML; MG/100ML
INJECTION, SOLUTION INTRAVENOUS AS NEEDED
Status: DISCONTINUED | OUTPATIENT
Start: 2022-11-21 | End: 2022-11-21 | Stop reason: HOSPADM

## 2022-11-21 RX ORDER — AMMONIA INHALANTS 0.04 G/.3ML
0.3 INHALANT RESPIRATORY (INHALATION) AS NEEDED
Status: DISCONTINUED | OUTPATIENT
Start: 2022-11-21 | End: 2022-11-21 | Stop reason: HOSPADM

## 2022-11-21 RX ORDER — BUPIVACAINE HYDROCHLORIDE 2.5 MG/ML
INJECTION, SOLUTION EPIDURAL; INFILTRATION; INTRACAUDAL
Status: COMPLETED | OUTPATIENT
Start: 2022-11-21 | End: 2022-11-21

## 2022-11-21 RX ORDER — TRISODIUM CITRATE DIHYDRATE AND CITRIC ACID MONOHYDRATE 500; 334 MG/5ML; MG/5ML
30 SOLUTION ORAL AS NEEDED
Status: DISCONTINUED | OUTPATIENT
Start: 2022-11-21 | End: 2022-11-21 | Stop reason: HOSPADM

## 2022-11-21 RX ORDER — IBUPROFEN 600 MG/1
600 TABLET ORAL EVERY 6 HOURS PRN
Status: DISCONTINUED | OUTPATIENT
Start: 2022-11-21 | End: 2022-11-21 | Stop reason: HOSPADM

## 2022-11-21 RX ORDER — ACETAMINOPHEN 500 MG
500 TABLET ORAL EVERY 6 HOURS PRN
Status: DISCONTINUED | OUTPATIENT
Start: 2022-11-21 | End: 2022-11-21 | Stop reason: HOSPADM

## 2022-11-21 RX ORDER — LIDOCAINE HYDROCHLORIDE AND EPINEPHRINE 15; 5 MG/ML; UG/ML
INJECTION, SOLUTION EPIDURAL
Status: COMPLETED | OUTPATIENT
Start: 2022-11-21 | End: 2022-11-21

## 2022-11-21 RX ORDER — MISOPROSTOL 200 UG/1
1000 TABLET ORAL ONCE AS NEEDED
Status: ACTIVE | OUTPATIENT
Start: 2022-11-21 | End: 2022-11-21

## 2022-11-21 RX ORDER — IBUPROFEN 600 MG/1
600 TABLET ORAL EVERY 6 HOURS
Status: DISCONTINUED | OUTPATIENT
Start: 2022-11-21 | End: 2022-11-22

## 2022-11-21 RX ORDER — BUPIVACAINE HYDROCHLORIDE 2.5 MG/ML
20 INJECTION, SOLUTION EPIDURAL; INFILTRATION; INTRACAUDAL ONCE
Status: DISCONTINUED | OUTPATIENT
Start: 2022-11-21 | End: 2022-11-21 | Stop reason: HOSPADM

## 2022-11-21 RX ORDER — MISOPROSTOL 200 UG/1
1000 TABLET ORAL ONCE
Status: DISCONTINUED | OUTPATIENT
Start: 2022-11-21 | End: 2022-11-21 | Stop reason: HOSPADM

## 2022-11-21 RX ORDER — BUPIVACAINE HCL/0.9 % NACL/PF 0.25 %
5 PLASTIC BAG, INJECTION (ML) EPIDURAL AS NEEDED
Status: DISCONTINUED | OUTPATIENT
Start: 2022-11-21 | End: 2022-11-21

## 2022-11-21 RX ORDER — ACETAMINOPHEN 500 MG
1000 TABLET ORAL EVERY 6 HOURS PRN
Status: DISCONTINUED | OUTPATIENT
Start: 2022-11-21 | End: 2022-11-22

## 2022-11-21 RX ORDER — LIDOCAINE HYDROCHLORIDE 10 MG/ML
30 INJECTION, SOLUTION EPIDURAL; INFILTRATION; INTRACAUDAL; PERINEURAL ONCE
Status: COMPLETED | OUTPATIENT
Start: 2022-11-21 | End: 2022-11-21

## 2022-11-21 RX ORDER — ACETAMINOPHEN 500 MG
500 TABLET ORAL EVERY 6 HOURS PRN
Status: DISCONTINUED | OUTPATIENT
Start: 2022-11-21 | End: 2022-11-22

## 2022-11-21 RX ORDER — ONDANSETRON 2 MG/ML
4 INJECTION INTRAMUSCULAR; INTRAVENOUS EVERY 6 HOURS PRN
Status: DISCONTINUED | OUTPATIENT
Start: 2022-11-21 | End: 2022-11-22

## 2022-11-21 RX ORDER — NALBUPHINE HCL 10 MG/ML
2.5 AMPUL (ML) INJECTION
Status: DISCONTINUED | OUTPATIENT
Start: 2022-11-21 | End: 2022-11-22

## 2022-11-21 RX ORDER — BUPIVACAINE HYDROCHLORIDE 2.5 MG/ML
20 INJECTION, SOLUTION EPIDURAL; INFILTRATION; INTRACAUDAL ONCE
Status: COMPLETED | OUTPATIENT
Start: 2022-11-21 | End: 2022-11-21

## 2022-11-21 RX ORDER — BISACODYL 10 MG
10 SUPPOSITORY, RECTAL RECTAL ONCE AS NEEDED
Status: DISCONTINUED | OUTPATIENT
Start: 2022-11-21 | End: 2022-11-22

## 2022-11-21 RX ORDER — DOCUSATE SODIUM 100 MG/1
100 CAPSULE, LIQUID FILLED ORAL
Status: DISCONTINUED | OUTPATIENT
Start: 2022-11-21 | End: 2022-11-22

## 2022-11-21 RX ORDER — BUPIVACAINE HCL/0.9 % NACL/PF 0.25 %
5 PLASTIC BAG, INJECTION (ML) EPIDURAL AS NEEDED
Status: DISCONTINUED | OUTPATIENT
Start: 2022-11-21 | End: 2022-11-22

## 2022-11-21 RX ORDER — TERBUTALINE SULFATE 1 MG/ML
0.25 INJECTION, SOLUTION SUBCUTANEOUS AS NEEDED
Status: DISCONTINUED | OUTPATIENT
Start: 2022-11-21 | End: 2022-11-21 | Stop reason: HOSPADM

## 2022-11-21 RX ORDER — NALBUPHINE HCL 10 MG/ML
2.5 AMPUL (ML) INJECTION
Status: DISCONTINUED | OUTPATIENT
Start: 2022-11-21 | End: 2022-11-21

## 2022-11-21 RX ORDER — LIDOCAINE HYDROCHLORIDE 10 MG/ML
INJECTION, SOLUTION INFILTRATION; PERINEURAL
Status: COMPLETED | OUTPATIENT
Start: 2022-11-21 | End: 2022-11-21

## 2022-11-21 RX ORDER — SIMETHICONE 80 MG
80 TABLET,CHEWABLE ORAL 3 TIMES DAILY PRN
Status: DISCONTINUED | OUTPATIENT
Start: 2022-11-21 | End: 2022-11-22

## 2022-11-21 RX ORDER — DIAPER,BRIEF,INFANT-TODD,DISP
1 EACH MISCELLANEOUS EVERY 6 HOURS PRN
Status: DISCONTINUED | OUTPATIENT
Start: 2022-11-21 | End: 2022-11-22

## 2022-11-21 RX ORDER — DEXTROSE, SODIUM CHLORIDE, SODIUM LACTATE, POTASSIUM CHLORIDE, AND CALCIUM CHLORIDE 5; .6; .31; .03; .02 G/100ML; G/100ML; G/100ML; G/100ML; G/100ML
INJECTION, SOLUTION INTRAVENOUS CONTINUOUS
Status: DISCONTINUED | OUTPATIENT
Start: 2022-11-21 | End: 2022-11-21 | Stop reason: HOSPADM

## 2022-11-21 RX ADMIN — LIDOCAINE HYDROCHLORIDE 5 ML: 10 INJECTION, SOLUTION INFILTRATION; PERINEURAL at 12:43:00

## 2022-11-21 RX ADMIN — LIDOCAINE HYDROCHLORIDE AND EPINEPHRINE 5 ML: 15; 5 INJECTION, SOLUTION EPIDURAL at 12:43:00

## 2022-11-21 RX ADMIN — BUPIVACAINE HYDROCHLORIDE 5 ML: 2.5 INJECTION, SOLUTION EPIDURAL; INFILTRATION; INTRACAUDAL at 12:43:00

## 2022-11-21 NOTE — ANESTHESIA POSTPROCEDURE EVALUATION
Patient: Amandeep Thomas    Procedure Summary     Date: 11/21/22 Room / Location:     Anesthesia Start: 1243 Anesthesia Stop: 1622    Procedure: LABOR ANALGESIA Diagnosis:     Scheduled Providers:  Anesthesiologist: Bart Kaiser MD    Anesthesia Type: epidural ASA Status: 2          Anesthesia Type: epidural    Vitals Value Taken Time   BP  11/21/22 1715   Temp  11/21/22 1715   Pulse  11/21/22 1715   Resp  11/21/22 1715   SpO2  11/21/22 1715       [facility] AN Post Evaluation:   Patient Evaluated in floor  Patient Participation: complete - patient participated  Level of Consciousness: awake  Pain Score: 0  Pain Management: adequate  Airway Patency:patent  Dental exam unchanged from preop  Yes    Cardiovascular Status: acceptable  Postoperative Hydration acceptable      Jimi Sorto MD  11/21/2022 5:15 PM

## 2022-11-21 NOTE — PROGRESS NOTES
Labor progress note    Patient doing well, breathing through contractions, awaiting epidural.     22  1130   BP: 118/66   Pulse: 81   Resp:    Temp:      FHT: 150s, mod variability, + accelerations, no decelerations  [place]: contractions q 2-3 min  Cervix: 7/80/-2, BBOW    A.P:  39 yo [id number] @ 40w0d, IOL for post-EDS  - pitocin paused until epidural  - cervix as above - after epidural, monitor for return of contractions, if none - re-start pitocin  - category 1 FHT  - anticipate     Olimpia Midget, DO

## 2022-11-21 NOTE — ANESTHESIA PROCEDURE NOTES
Labor Analgesia    Date/Time: 11/21/2022 12:43 PM  Performed by: Betty Vo MD  Authorized by: Betty Vo MD       General Information and Staff    Start Time:  11/21/2022 12:43 PM  End Time:  11/21/2022 12:53 PM  Anesthesiologist:  Betty Vo MD  Performed by:   Anesthesiologist  Patient Location:  OB  Site Identification: surface landmarks    Reason for Block: labor epidural    Preanesthetic Checklist: patient identified, IV checked, site marked, risks and benefits discussed, monitors and equipment checked, pre-op evaluation, timeout performed, anesthesia consent and sterile technique used      Procedure Details    Patient Position:  Sitting  Prep: ChloraPrep    Monitoring:  Heart rate  Approach:  Midline    Epidural Needle    Injection Technique:  ONELIA air  Needle Type:  Tuohy  Needle Gauge:  18 G  Needle Length:  3.5 in  Location:  L2-3    Spinal Needle      Catheter    Catheter Type:  Multi-orifice  Catheter Size:  20 G  Catheter at Skin Depth:  12  Test Dose:  Negative    Assessment    Sensory Level:  T6    Additional Comments     Motor intact

## 2022-11-21 NOTE — L&D DELIVERY NOTE
Blaze Araujo [[id number]]    Labor Events    Rupture date/time: 2022 1523     Rupture type: AROM  Fluid color: Clear     Labor Event Times    Dilation complete date/time: 2022 1523     [place] Presentation    Presentation: Vertex  Position: Middle Occiput Anterior     Operative Delivery    Operative Vaginal Delivery: No            Shoulder Dystocia    Shoulder Dystocia: No     Anesthesia    Method: Epidural          [place] Delivery    Head delivery date/time: 2022 16:19:34   Delivery date/time:  22 16:19:34   Delivery type: Normal spontaneous vaginal delivery    Details:     Delivery location: delivery room  Delivery Room Temperature: 70     Delivery Providers    Delivering Clinician: Janee Lockhart DO   Delivery personnel:  Provider Role   Janes Nayak RN Baby Nurse   Niurka Dumas, RN Delivery Nurse   Bonilla Alvarez Surgical Tech         Cord    Vessels: 3 Vessels  Complications: None  Timed cord clamping: Yes  Time in sec: 60  Cord blood disposition: to lab  Gases sent?: No     Resuscitation    Method: None      Measurements    No data filed     Placenta    Date/time: 2022 162  Removal: Spontaneous  Appearance: Intact  Disposition: Pathology     Apgars    Living status: Living   Apgar Scoring Key:    0 1 2    Skin color Blue or pale Acrocyanotic Completely pink    Heart rate Absent <100 bpm >100 bpm    Reflex irritability No response Grimace Cry or active withdrawal    Muscle tone Limp Some flexion Active motion    Respiratory effort Absent Weak cry; hypoventilation Good, crying              1 Minute:  5 Minute:  10 Minute:  15 Minute:  20 Minute:    Skin color: 0  1       Heart rate: 2  2       Reflex irritablity: 2  2       Muscle tone: 2  2       Respiratory effort: 2  2       Total: 8  9          Apgars assigned by: DAVEY PEREZ RN   disposition: with mother     Skin to Skin    Skin to skin initiated date/time: 2022 1619  Skin to skin with: Mother     Vaginal Count    Initial count RN: Marimar Santos RN  Initial count Tech: Josefa Fitting   Sponges   Sharps    Initial counts 10   0    Final counts        Final count RN: Marimar Santos RN  Final count MD: Pina Bernal DO     Delivery (Maternal)    Episiotomy: None  Perineal lacerations: 2nd Repaired?: Yes   Vaginal laceration?: No    Cervical laceration?: No    Clitoral laceration?: No              [facility]   Vaginal Delivery Procedure Note      Amandeep Thomas Patient Status:  Inpatient    1980 MRN [id number]   Location [address]  Attending Pina Bernal DO   Hosp Day # 0 PCP Walter Coffey MD     Date of Delivery: 22    Pre procedure diagnosis:  IUP at Term    Post procedure diagnosis: Same, delivered, elective IOL @ term    Procedure: Normal Spontaneous Vaginal Delivery    Attending: Dr. Ulysses Burger DO      Anesthesia: Epidural    EBL:  150 mL    Indications:  Patient is a 40year old [id number] at 37w0d who presented for scheduled induction of labor, she progressed to complete cervical dilation with pitocin augmentation (which was only running for about 1 hour.)      Findings:    Sex: male     Weight:pending measurement      Apgars: 8/9      Lacerations: 2nd degree perineal laceration, no periurethral, no cervical       Procedure: The patient was confirmed to be completely dilated. The patients was placed in the dorsolithotomy position. She was then encouraged to push. As the head was delivered in OA position, the perineum was supported to decrease the risk of tearing. The shoulders rotated spontaneously and delivery was completed without complication. The cord was doubly clamped then cut after 60 seconds. The baby was placed on the mother's abdomen. IV pitocin bolus was initiated. The cord blood was sampled. The placenta then delivered intact. The uterus was noted to be firm. Good hemostasis was noted.  The perineum, vaginal mucosa and cervix was then examined. A 2nd degree perineal laceration repaired with 2-0 vicryl. Bleeding from laceration site was minimal. Blood clot noted in lower uterine segment, 1000 mcg cytotec placed rectally. The patient was then moved to the supine position in stable condition. Sponge, needle, and instrument counts were correct. Complications:  None     Mother and infant in good condition in the delivery room.     Nallely Coates, DO  [address] [facility]

## 2022-11-22 VITALS
OXYGEN SATURATION: 95 % | HEIGHT: 64.02 IN | HEART RATE: 99 BPM | BODY MASS INDEX: 38.69 KG/M2 | TEMPERATURE: 98 F | RESPIRATION RATE: 16 BRPM | SYSTOLIC BLOOD PRESSURE: 127 MMHG | DIASTOLIC BLOOD PRESSURE: 69 MMHG | WEIGHT: 226.63 LBS

## 2022-11-22 PROBLEM — Z34.90 PREGNANT: Status: RESOLVED | Noted: 2022-11-21 | Resolved: 2022-11-22

## 2022-11-22 PROBLEM — Z34.90 PREGNANT (HCC): Status: RESOLVED | Noted: 2022-11-21 | Resolved: 2022-11-22

## 2022-11-22 PROBLEM — O09.523 ADVANCED MATERNAL AGE IN MULTIGRAVIDA, THIRD TRIMESTER: Status: RESOLVED | Noted: 2022-05-12 | Resolved: 2022-11-22

## 2022-11-22 PROBLEM — O09.523 ADVANCED MATERNAL AGE IN MULTIGRAVIDA, THIRD TRIMESTER (HCC): Status: RESOLVED | Noted: 2022-05-12 | Resolved: 2022-11-22

## 2022-11-22 RX ORDER — ALBUTEROL SULFATE 90 UG/1
2 AEROSOL, METERED RESPIRATORY (INHALATION) EVERY 6 HOURS PRN
Qty: 18 G | Refills: 2 | Status: SHIPPED | OUTPATIENT
Start: 2022-11-22

## 2022-11-22 NOTE — LACTATION NOTE
LACTATION NOTE - MOTHER      Evaluation Type: Inpatient    Problems identified  Problems identified: Knowledge deficit  Problems Identified Other: Samantha Bermudez requested a review of technique for deep latch of the  due to occassional shallow latch    Maternal history  Maternal history: AMA  Other/comment: hx PPD    Breastfeeding goal  Breastfeeding goal: To maintain breast milk feeding per patient goal    Maternal Assessment  Prior breastfeeding experience (comment below): Multip; Successful  Prior BF experience: comment: X2, 3+ years each, hx of abundant supply reported, managed with block feeding schedule  Breastfeeding Assistance:  [facility] assistance declined at this time    Pain assessment  Pain, additional: Pain location;Pain w/initial sucks only  Pain Location: Nipples  Location/Comment: Not with every feeding, reports discomfort when infant becomes sleepy at breast  Treatment of Sore Nipples: Deeper latch techniques; Expressed breast milk; Lanolin;Coconut oil    Guidelines for use of:  Equipment: Lanolin  Other (comment): Verbally reviewed deep latch techniques, visual illustration provided with recommendation of proper support and positioning. Vianey Delvalle

## 2022-11-22 NOTE — PROGRESS NOTES
No heavy bleeding or pain. AFEB VSS  FF    PPD#1 stable. Would like to go home. DW her discharge instructions.  FU in 6 W

## 2022-11-22 NOTE — PLAN OF CARE
Problem: BIRTH - VAGINAL/ SECTION  Goal: Fetal and maternal status remain reassuring during the birth process  Description: INTERVENTIONS:  - Monitor vital signs  - Monitor fetal heart rate  - Monitor uterine activity  - Monitor labor progression (vaginal delivery)  - DVT prophylaxis (C/S delivery)  - Surgical antibiotic prophylaxis (C/S delivery)  Outcome: Progressing     Problem: PAIN - ADULT  Goal: Verbalizes/displays adequate comfort level or patient's stated pain goal  Description: INTERVENTIONS:  - Encourage pt to monitor pain and request assistance  - Assess pain using appropriate pain scale  - Administer analgesics based on type and severity of pain and evaluate response  - Implement non-pharmacological measures as appropriate and evaluate response  - Consider cultural and social influences on pain and pain management  - Manage/alleviate anxiety  - Utilize distraction and/or relaxation techniques  - Monitor for opioid side effects  - Notify MD/LIP if interventions unsuccessful or patient reports new pain  - Anticipate increased pain with activity and pre-medicate as appropriate  Outcome: Progressing     Problem: ANXIETY  Goal: Will report anxiety at manageable levels  Description: INTERVENTIONS:  - Administer medication as ordered  - Teach and rehearse alternative coping skills  - Provide emotional support with 1:1 interaction with staff  Outcome: Progressing     Sat with patient and updated patient and family on plan of care. Pain medication discussed and answered all questions. Vitals are WNL. Breastfeeding on demand and breastfeeding successfully. Lochia WNL and fundus is firm. Will call RN with any questions.

## 2022-11-22 NOTE — BH PROGRESS NOTE
[facility] PPD SCREENING    Reason for Referral: EPDS 10    Current Stressors:    History of PPD: hx with PPD with both previous pregnancies - \"right after I'm fine but 1-2 weeks later I get really anxious/overwhelmed, the first time I didn't know and that was bad but the second I recognized it and had a therapist. It usually lasts around 3 months\"   Financial: \"no\"   Other: Santa Mcadams today reports \"for questions 4 and 5 I answered those in regard to anticipation of the labor and delivery process, I had a scheduled induction so it was as if there was a countdown as to when everything would be happening. For question 6 I answered a 2 d/t some sadness that was subsequent of the anxiety I reported before, I was getting a lot of intrusive worst case scenario thoughts about what could possibly go wrong with this delivery, I knew even when I was having these thoughts that I've had two successful pregnancies that this wasn't a logical thought it took me a little longer to pull myself out of that sadness than I feel like it should have, this was also the one day that I was crying (answer to question 9). I should've checked the answers because I meant to put a zero on question 7, but I was having some pregnancy insomnia d/t physical pain, not because of sadness. \"I have a list of therapists that I'll be calling, I have zoloft prescription filled but am going to wait until I get home to start it. I had a therapist for a month after my second pregnancy, we had some scheduling conflicts and the depression got better so I wasn't concerned. No hx outpatient/iop/php/inpt tx.  No hx [facility] sx/dx/tx    Mental Health Status:    Current Symptoms: denies current sx   Appearance/General Behavior: stated age female sitting in hospital bed tending to baby   General Cognitive Functioning: intact   Thought Process: linear, sequential   Thought Content: appropriate to situation   Mood/Affect: calm, affect congruent to mood   Orientation: a/o x4   Speech: normal rate, rhythm, and volume   Homicidal/Suicidal Ideation/Plan: no hx SI/HI/SIB CSSR = 0, INDICATING LOW RISK    Living Arrangement:    Who Resides at Home: \"I live in [place] with my , our two daughters, and now baby Teo Olsen. 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 They are watching our daughters right now\"   Has Other Support Network: \"I have good supports\"    Plan:    Provide PPD Information:     Postpartum Depression Resources Given: yes    [employer] Information Given: yes    Depression During and After Pregnancy Information given: yes   Provide [facility]BE[facility] Contact Information: in discharge instructions   Other Information Given: [facility]  supports, additional in network providers    [facility]    Note to patient:  The [facility] makes medical notes like these available to patients in the interest of transparency. However, be advised this is a medical document. It is intended as peer to peer communication. It is written in medical language and may contain abbreviations or verbiage that are unfamiliar. It may appear blunt or direct. Medical documents are intended to carry relevant information, facts as evident, and the clinical opinion of the practitioner.

## 2022-12-19 ENCOUNTER — TELEPHONE (OUTPATIENT)
Dept: OBGYN UNIT | Facility: HOSPITAL | Age: 42
End: 2022-12-19

## 2023-01-06 ENCOUNTER — POSTPARTUM (OUTPATIENT)
Dept: OBGYN CLINIC | Facility: CLINIC | Age: 43
End: 2023-01-06
Payer: COMMERCIAL

## 2023-01-06 VITALS
SYSTOLIC BLOOD PRESSURE: 114 MMHG | HEIGHT: 64 IN | BODY MASS INDEX: 36.13 KG/M2 | WEIGHT: 211.63 LBS | DIASTOLIC BLOOD PRESSURE: 68 MMHG

## 2023-01-06 DIAGNOSIS — M25.559 HIP PAIN: ICD-10-CM

## 2023-01-06 PROCEDURE — 3074F SYST BP LT 130 MM HG: CPT | Performed by: OBSTETRICS & GYNECOLOGY

## 2023-01-06 PROCEDURE — 3078F DIAST BP <80 MM HG: CPT | Performed by: OBSTETRICS & GYNECOLOGY

## 2023-01-06 PROCEDURE — 3008F BODY MASS INDEX DOCD: CPT | Performed by: OBSTETRICS & GYNECOLOGY

## 2023-02-13 ENCOUNTER — APPOINTMENT (OUTPATIENT)
Dept: GENERAL RADIOLOGY | Age: 43
End: 2023-02-13
Attending: NURSE PRACTITIONER
Payer: COMMERCIAL

## 2023-02-13 ENCOUNTER — HOSPITAL ENCOUNTER (OUTPATIENT)
Age: 43
Discharge: HOME | End: 2023-02-13
Payer: COMMERCIAL

## 2023-02-13 VITALS
HEART RATE: 87 BPM | RESPIRATION RATE: 18 BRPM | SYSTOLIC BLOOD PRESSURE: 131 MMHG | DIASTOLIC BLOOD PRESSURE: 74 MMHG | TEMPERATURE: 97 F | OXYGEN SATURATION: 97 %
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
## 2023-02-13 DIAGNOSIS — M54.50 ACUTE MIDLINE LOW BACK PAIN WITHOUT SCIATICA: ICD-10-CM

## 2023-02-13 LAB — B-HCG UR QL: NEGATIVE

## 2023-02-13 PROCEDURE — 81025 URINE PREGNANCY TEST: CPT | Performed by: NURSE PRACTITIONER

## 2023-02-13 PROCEDURE — 72100 X-RAY EXAM L-S SPINE 2/3 VWS: CPT | Performed by: NURSE PRACTITIONER

## 2023-02-13 PROCEDURE — 99213 OFFICE O/P EST LOW 20 MIN: CPT | Performed by: NURSE PRACTITIONER
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
## 2023-04-24 ENCOUNTER — OFFICE VISIT (OUTPATIENT)
Dept: FAMILY MEDICINE CLINIC | Facility: CLINIC | Age: 43
End: 2023-04-24

## 2023-04-24 VITALS
SYSTOLIC BLOOD PRESSURE: 122 MMHG | RESPIRATION RATE: 17 BRPM | OXYGEN SATURATION: 98 % | HEIGHT: 64 IN | WEIGHT: 218 LBS | BODY MASS INDEX: 37.22 KG/M2 | DIASTOLIC BLOOD PRESSURE: 81 MMHG

## 2023-04-24 DIAGNOSIS — R42 VERTIGO: Primary | ICD-10-CM

## 2023-04-24 DIAGNOSIS — J06.9 VIRAL URI WITH COUGH: ICD-10-CM

## 2023-04-24 DIAGNOSIS — R03.0 ELEVATED BP WITHOUT DIAGNOSIS OF HYPERTENSION: ICD-10-CM

## 2023-04-24 PROCEDURE — 3008F BODY MASS INDEX DOCD: CPT | Performed by: FAMILY MEDICINE

## 2023-04-24 PROCEDURE — 3074F SYST BP LT 130 MM HG: CPT | Performed by: FAMILY MEDICINE

## 2023-04-24 PROCEDURE — 99214 OFFICE O/P EST MOD 30 MIN: CPT | Performed by: FAMILY MEDICINE

## 2023-04-24 PROCEDURE — 3079F DIAST BP 80-89 MM HG: CPT | Performed by: FAMILY MEDICINE

## 2023-09-26 ENCOUNTER — LAB ENCOUNTER (OUTPATIENT)
Dept: LAB | Age: 43
End: 2023-09-26
Attending: FAMILY MEDICINE
Payer: COMMERCIAL

## 2023-09-26 ENCOUNTER — OFFICE VISIT (OUTPATIENT)
Dept: FAMILY MEDICINE CLINIC | Facility: CLINIC | Age: 43
End: 2023-09-26

## 2023-09-26 VITALS
HEIGHT: 64 IN | WEIGHT: 212 LBS | BODY MASS INDEX: 36.19 KG/M2 | HEART RATE: 87 BPM | SYSTOLIC BLOOD PRESSURE: 100 MMHG | OXYGEN SATURATION: 94 % | DIASTOLIC BLOOD PRESSURE: 69 MMHG | TEMPERATURE: 98 F

## 2023-09-26 DIAGNOSIS — Z00.00 ANNUAL PHYSICAL EXAM: Primary | ICD-10-CM

## 2023-09-26 DIAGNOSIS — Z12.31 VISIT FOR SCREENING MAMMOGRAM: ICD-10-CM

## 2023-09-26 DIAGNOSIS — L30.9 DERMATITIS: ICD-10-CM

## 2023-09-26 DIAGNOSIS — S10.86XS: ICD-10-CM
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
## 2023-09-26 PROCEDURE — 90686 IIV4 VACC NO PRSV 0.5 ML IM: CPT | Performed by: FAMILY MEDICINE

## 2023-09-26 PROCEDURE — 3074F SYST BP LT 130 MM HG: CPT | Performed by: FAMILY MEDICINE

## 2023-09-26 PROCEDURE — 90471 IMMUNIZATION ADMIN: CPT | Performed by: FAMILY MEDICINE

## 2023-09-26 PROCEDURE — 3078F DIAST BP <80 MM HG: CPT | Performed by: FAMILY MEDICINE

## 2023-09-26 PROCEDURE — 99396 PREV VISIT EST AGE 40-64: CPT | Performed by: FAMILY MEDICINE

## 2023-09-26 PROCEDURE — 3008F BODY MASS INDEX DOCD: CPT | Performed by: FAMILY MEDICINE

## 2023-09-26 RX ORDER — TRIAMCINOLONE ACETONIDE 1 MG/G
CREAM TOPICAL 2 TIMES DAILY PRN
Qty: 60 G | Refills: 0 | Status: SHIPPED | OUTPATIENT
Start: 2023-09-26

## 2023-09-27 ENCOUNTER — PATIENT MESSAGE (OUTPATIENT)
Dept: FAMILY MEDICINE CLINIC | Facility: CLINIC | Age: 43
End: 2023-09-27

## 2023-09-27 LAB
ABSOLUTE BASOPHILS: 30 CELLS/UL (ref 0–200)
ABSOLUTE EOSINOPHILS: 90 CELLS/UL (ref 15–500)
ABSOLUTE LYMPHOCYTES: 1685 CELLS/UL (ref 850–3900)
ABSOLUTE MONOCYTES: 540 CELLS/UL (ref 200–950)
ABSOLUTE NEUTROPHILS: 2655 CELLS/UL (ref 1500–7800)
ALBUMIN/GLOBULIN RATIO: 1.5 (CALC) (ref 1–2.5)
ALBUMIN: 4 G/DL (ref 3.6–5.1)
ALKALINE PHOSPHATASE: 82 U/L (ref 31–125)
ALT: 26 U/L (ref 6–29)
AST: 23 U/L (ref 10–30)
BASOPHILS: 0.6 %
BILIRUBIN, TOTAL: 0.4 MG/DL (ref 0.2–1.2)
BUN: 13 MG/DL (ref 7–25)
CALCIUM: 9.1 MG/DL (ref 8.6–10.2)
CARBON DIOXIDE: 26 MMOL/L (ref 20–32)
CHLORIDE: 103 MMOL/L (ref 98–110)
CHOL/HDLC RATIO: 2.7 (CALC)
CHOLESTEROL, TOTAL: 170 MG/DL
CREATININE: 0.75 MG/DL (ref 0.5–0.99)
EGFR: 102 ML/MIN/1.73M2
EOSINOPHILS: 1.8 %
GLOBULIN: 2.6 G/DL (CALC) (ref 1.9–3.7)
GLUCOSE: 116 MG/DL (ref 65–99)
HDL CHOLESTEROL: 63 MG/DL
HEMATOCRIT: 40.2 % (ref 35–45)
HEMOGLOBIN A1C: 5.1 % OF TOTAL HGB
HEMOGLOBIN: 13.3 G/DL (ref 11.7–15.5)
LDL-CHOLESTEROL: 86 MG/DL (CALC)
LYME AB SCREEN: <0.9 INDEX
LYMPHOCYTES: 33.7 %
MCH: 29.2 PG (ref 27–33)
MCHC: 33.1 G/DL (ref 32–36)
MCV: 88.2 FL (ref 80–100)
MONOCYTES: 10.8 %
MPV: 9.2 FL (ref 7.5–12.5)
NEUTROPHILS: 53.1 %
NON-HDL CHOLESTEROL: 107 MG/DL (CALC)
PLATELET COUNT: 251 THOUSAND/UL (ref 140–400)
POTASSIUM: 4.1 MMOL/L (ref 3.5–5.3)
PROTEIN, TOTAL: 6.6 G/DL (ref 6.1–8.1)
RDW: 12.6 % (ref 11–15)
RED BLOOD CELL COUNT: 4.56 MILLION/UL (ref 3.8–5.1)
SODIUM: 139 MMOL/L (ref 135–146)
TRIGLYCERIDES: 116 MG/DL
TSH W/REFLEX TO FT4: 1.72 MIU/L
WHITE BLOOD CELL COUNT: 5 THOUSAND/UL (ref 3.8–10.8)

## 2023-09-28 NOTE — TELEPHONE ENCOUNTER
From: Sharri Barrett  To: Sera Brown  Sent: 9/27/2023 2:25 PM CDT  Subject: Question regarding COMP METABOLIC PANEL (14)    Looks like my glucose was high. Which is not surprising because I had an oat milk latte with honey and blueberry muffin within an hour prior to that test. However let me know what follow up I should do. I will work harder to have better eating habits and more exercise. With my family history of diabetes I worry any time my weight gets really high (like with pregnancies) or I see glucose tests come back high.

## 2023-10-04 ENCOUNTER — HOSPITAL ENCOUNTER (OUTPATIENT)
Age: 43
Discharge: ANOTHER HOSPITAL | End: 2023-10-04

## 2023-10-04 ENCOUNTER — APPOINTMENT (OUTPATIENT)
Dept: CT IMAGING | Facility: HOSPITAL | Age: 43
End: 2023-10-04
Attending: EMERGENCY MEDICINE

## 2023-10-04 ENCOUNTER — HOSPITAL ENCOUNTER (EMERGENCY)
Facility: HOSPITAL | Age: 43
Discharge: HOME | End: 2023-10-04
Attending: EMERGENCY MEDICINE

## 2023-10-04 ENCOUNTER — APPOINTMENT (OUTPATIENT)
Dept: GENERAL RADIOLOGY | Facility: HOSPITAL | Age: 43
End: 2023-10-04

## 2023-10-04 VITALS
WEIGHT: 213 LBS | HEIGHT: 64 IN | OXYGEN SATURATION: 96 % | BODY MASS INDEX: 36.37 KG/M2 | DIASTOLIC BLOOD PRESSURE: 59 MMHG | TEMPERATURE: 97 F | HEART RATE: 61 BPM | SYSTOLIC BLOOD PRESSURE: 102 MMHG | RESPIRATION RATE: 21 BRPM

## 2023-10-04 VITALS
RESPIRATION RATE: 20 BRPM | SYSTOLIC BLOOD PRESSURE: 138 MMHG | HEART RATE: 85 BPM | OXYGEN SATURATION: 98 % | TEMPERATURE: 99 F | DIASTOLIC BLOOD PRESSURE: 90 MMHG

## 2023-10-04 DIAGNOSIS — R07.81 RIB PAIN ON RIGHT SIDE: ICD-10-CM

## 2023-10-04 DIAGNOSIS — R07.89 CHEST PAIN, ATYPICAL: Primary | ICD-10-CM

## 2023-10-04 DIAGNOSIS — R07.81 RIB PAIN ON LEFT SIDE: ICD-10-CM

## 2023-10-04 DIAGNOSIS — R06.02 SHORTNESS OF BREATH: ICD-10-CM

## 2023-10-04 DIAGNOSIS — N10 ACUTE PYELONEPHRITIS: Primary | ICD-10-CM

## 2023-10-04 LAB
ALBUMIN SERPL-MCNC: 3.9 G/DL (ref 3.4–5)
ALBUMIN/GLOB SERPL: 0.9 {RATIO} (ref 1–2)
ALP LIVER SERPL-CCNC: 92 U/L
ALT SERPL-CCNC: 32 U/L
ANION GAP SERPL CALC-SCNC: 7 MMOL/L (ref 0–18)
AST SERPL-CCNC: 21 U/L (ref 15–37)
B-HCG UR QL: NEGATIVE
BASOPHILS # BLD AUTO: 0.01 X10(3) UL (ref 0–0.2)
BASOPHILS NFR BLD AUTO: 0.2 %
BILIRUB SERPL-MCNC: 0.5 MG/DL (ref 0.1–2)
BILIRUB UR QL: NEGATIVE
BUN BLD-MCNC: 13 MG/DL (ref 7–18)
BUN/CREAT SERPL: 16.3 (ref 10–20)
CALCIUM BLD-MCNC: 9.4 MG/DL (ref 8.5–10.1)
CHLORIDE SERPL-SCNC: 107 MMOL/L (ref 98–112)
CO2 SERPL-SCNC: 27 MMOL/L (ref 21–32)
COLOR UR: YELLOW
CREAT BLD-MCNC: 0.8 MG/DL
DEPRECATED RDW RBC AUTO: 41.3 FL (ref 35.1–46.3)
EGFRCR SERPLBLD CKD-EPI 2021: 94 ML/MIN/1.73M2 (ref 60–?)
EOSINOPHIL # BLD AUTO: 0.1 X10(3) UL (ref 0–0.7)
EOSINOPHIL NFR BLD AUTO: 1.7 %
ERYTHROCYTE [DISTWIDTH] IN BLOOD BY AUTOMATED COUNT: 12.8 % (ref 11–15)
GLOBULIN PLAS-MCNC: 4.3 G/DL (ref 2.8–4.4)
GLUCOSE BLD-MCNC: 86 MG/DL (ref 70–99)
GLUCOSE UR-MCNC: NORMAL MG/DL
HCT VFR BLD AUTO: 46.4 %
HGB BLD-MCNC: 14.8 G/DL
HGB UR QL STRIP.AUTO: NEGATIVE
IMM GRANULOCYTES # BLD AUTO: 0.01 X10(3) UL (ref 0–1)
IMM GRANULOCYTES NFR BLD: 0.2 %
KETONES UR-MCNC: 10 MG/DL
LEUKOCYTE ESTERASE UR QL STRIP.AUTO: 250
LYMPHOCYTES # BLD AUTO: 1.88 X10(3) UL (ref 1–4)
LYMPHOCYTES NFR BLD AUTO: 31.1 %
MCH RBC QN AUTO: 27.9 PG (ref 26–34)
MCHC RBC AUTO-ENTMCNC: 31.9 G/DL (ref 31–37)
MCV RBC AUTO: 87.5 FL
MONOCYTES # BLD AUTO: 0.53 X10(3) UL (ref 0.1–1)
MONOCYTES NFR BLD AUTO: 8.8 %
NEUTROPHILS # BLD AUTO: 3.51 X10 (3) UL (ref 1.5–7.7)
NEUTROPHILS # BLD AUTO: 3.51 X10(3) UL (ref 1.5–7.7)
NEUTROPHILS NFR BLD AUTO: 58 %
NITRITE UR QL STRIP.AUTO: NEGATIVE
OSMOLALITY SERPL CALC.SUM OF ELEC: 291 MOSM/KG (ref 275–295)
PH UR: 6 [PH] (ref 5–8)
PLATELET # BLD AUTO: 268 10(3)UL (ref 150–450)
POTASSIUM SERPL-SCNC: 4.3 MMOL/L (ref 3.5–5.1)
PROT SERPL-MCNC: 8.2 G/DL (ref 6.4–8.2)
RBC # BLD AUTO: 5.3 X10(6)UL
SODIUM SERPL-SCNC: 141 MMOL/L (ref 136–145)
SP GR UR STRIP: 1.02 (ref 1–1.03)
TROPONIN I HIGH SENSITIVITY: 5 NG/L
UROBILINOGEN UR STRIP-ACNC: NORMAL
WBC # BLD AUTO: 6 X10(3) UL (ref 4–11)

## 2023-10-04 PROCEDURE — 84484 ASSAY OF TROPONIN QUANT: CPT | Performed by: EMERGENCY MEDICINE

## 2023-10-04 PROCEDURE — 93000 ELECTROCARDIOGRAM COMPLETE: CPT | Performed by: EMERGENCY MEDICINE

## 2023-10-04 PROCEDURE — 87086 URINE CULTURE/COLONY COUNT: CPT | Performed by: EMERGENCY MEDICINE

## 2023-10-04 PROCEDURE — 96375 TX/PRO/DX INJ NEW DRUG ADDON: CPT

## 2023-10-04 PROCEDURE — 71045 X-RAY EXAM CHEST 1 VIEW: CPT | Performed by: EMERGENCY MEDICINE

## 2023-10-04 PROCEDURE — 99215 OFFICE O/P EST HI 40 MIN: CPT | Performed by: EMERGENCY MEDICINE

## 2023-10-04 PROCEDURE — 84484 ASSAY OF TROPONIN QUANT: CPT

## 2023-10-04 PROCEDURE — 80053 COMPREHEN METABOLIC PANEL: CPT | Performed by: EMERGENCY MEDICINE

## 2023-10-04 PROCEDURE — 99285 EMERGENCY DEPT VISIT HI MDM: CPT

## 2023-10-04 PROCEDURE — 80053 COMPREHEN METABOLIC PANEL: CPT

## 2023-10-04 PROCEDURE — 85025 COMPLETE CBC W/AUTO DIFF WBC: CPT

## 2023-10-04 PROCEDURE — 71260 CT THORAX DX C+: CPT | Performed by: EMERGENCY MEDICINE

## 2023-10-04 PROCEDURE — 96365 THER/PROPH/DIAG IV INF INIT: CPT

## 2023-10-04 PROCEDURE — 81001 URINALYSIS AUTO W/SCOPE: CPT | Performed by: EMERGENCY MEDICINE

## 2023-10-04 PROCEDURE — 81025 URINE PREGNANCY TEST: CPT

## 2023-10-04 PROCEDURE — 74176 CT ABD & PELVIS W/O CONTRAST: CPT | Performed by: EMERGENCY MEDICINE

## 2023-10-04 PROCEDURE — 85025 COMPLETE CBC W/AUTO DIFF WBC: CPT | Performed by: EMERGENCY MEDICINE

## 2023-10-04 RX ORDER — MORPHINE SULFATE 2 MG/ML
2 INJECTION, SOLUTION INTRAMUSCULAR; INTRAVENOUS ONCE
Status: DISCONTINUED | OUTPATIENT
Start: 2023-10-04 | End: 2023-10-04

## 2023-10-04 RX ORDER — DIPHENHYDRAMINE HCL 25 MG
25 CAPSULE ORAL ONCE
Status: COMPLETED | OUTPATIENT
Start: 2023-10-04 | End: 2023-10-04

## 2023-10-04 RX ORDER — CEPHALEXIN 500 MG/1
500 CAPSULE ORAL 2 TIMES DAILY
Qty: 20 CAPSULE | Refills: 0 | Status: SHIPPED | OUTPATIENT
Start: 2023-10-04 | End: 2023-10-06

## 2023-10-04 RX ORDER — KETOROLAC TROMETHAMINE 15 MG/ML
15 INJECTION, SOLUTION INTRAMUSCULAR; INTRAVENOUS ONCE
Status: COMPLETED | OUTPATIENT
Start: 2023-10-04 | End: 2023-10-04

## 2023-10-04 RX ORDER — AMOXICILLIN AND CLAVULANATE POTASSIUM 875; 125 MG/1; MG/1
1 TABLET, FILM COATED ORAL 2 TIMES DAILY
Qty: 20 TABLET | Refills: 0 | Status: SHIPPED | OUTPATIENT
Start: 2023-10-04 | End: 2023-10-14

## 2023-10-04 NOTE — ED INITIAL ASSESSMENT (HPI)
Pt here with chest pain and upper back pain. Pt states \"its a tightness to my chest and feels like a contraction. \" Pain started yesterday. Slight SOB however that is normal for pt.

## 2023-10-04 NOTE — ED QUICK NOTES
Rn at bedside to remove piv. Superior and proximal to piv insertion site was a focal hive. Pt also noted to have petechiae to left upper arm and also had scalp pruritus. Dr. Debi Parekh called to bedside. Pt's discharge abx to be changed per dr. Debi Parekh.

## 2023-10-04 NOTE — ED INITIAL ASSESSMENT (HPI)
Pt arrived to ED from [address] [place] [address] c/o back and chest pain x approximately 1 day.

## 2023-10-04 NOTE — DISCHARGE INSTRUCTIONS
Nothing to eat or drink on your way to the emergency room. We are sending you to the emergency room due to your type of chest pain, and radiating pain. It should not be to the center upper part of your back, and it should not be radiating to your ribs.

## 2023-10-05 LAB
ATRIAL RATE: 71 BPM
P AXIS: 66 DEGREES
P-R INTERVAL: 126 MS
Q-T INTERVAL: 388 MS
QRS DURATION: 76 MS
QTC CALCULATION (BEZET): 421 MS
R AXIS: 35 DEGREES
T AXIS: 43 DEGREES
VENTRICULAR RATE: 71 BPM

## 2023-10-06 ENCOUNTER — TELEPHONE (OUTPATIENT)
Dept: FAMILY MEDICINE CLINIC | Facility: CLINIC | Age: 43
End: 2023-10-06

## 2023-10-06 NOTE — TELEPHONE ENCOUNTER
Patient contacted and made aware of Dr. Brooke Hernandez interpretation and recommendations. Advised appointment was scheduled. Patient verbalized understanding. No further questions or concerns at this time.     Future Appointments   Date Time Provider Ingrid Monsalve   10/9/2023  4:45 PM Joni Webster MD [address] [place] [address]

## 2023-10-06 NOTE — TELEPHONE ENCOUNTER
Imaging/labs reviewed. Pain sounds muscular. No need to take antibiotics. Please advise heat, gentle stretching, anti-inflammatories.   Please have her follow-up with me on Monday

## 2023-10-06 NOTE — TELEPHONE ENCOUNTER
Patient seen in office by Dr. Vidya Medina on 9/26/23    Patient went to ED on 10/4/2023. She was diagnosed patient with acute UTI with polynephritis and she was IV antibiotics and pain meds:     amoxicillin clavulanate 875-125 MG Oral Tab 20 tablet 0 10/4/2023 10/14/2023    Sig - Route: Take 1 tablet by mouth 2 (two) times daily for 10 days. - Oral    Sent to pharmacy as: Amoxicillin-Pot Clavulanate 875-125 MG Oral Tablet (Augmentin)    E-Prescribing Status: Receipt confirmed by pharmacy (10/4/2023  4:43 PM CDT)    She states she was also prescribed Keflex, but they changed their mind related to patient allergy and gave only above Rx. She states that UA with Culture was performed --> no growth 18-24 hours. She still has some symptoms, she states she started to feel better yesterday, then today she states some symptoms have returned. She reports the following: Urinary Urgency: denies. Urinary Frequency: denies. Urinary Foul Odor: denies. Dysuria: denies. She has some vaginal itchiness and intermittent and mild. Color and Clarity of Urine: bold yellow and clarity not observed. Hematuria: denies. Fever/Chills: denies. Back/Flank pain: some L sided flank pain is present, worse upon movement-->twisting and bending. Dull pain when still --> 0-1/10, with movement pain is 5-8/10 and contacting pain she states like squeezing and intense. She also reports increased pain with deep breathing --> she has taken Tylenol 1000 mg total OTC as directed every 6 hours consistently today, took twice. Denies any vaginal discharge. She does have some very mild vaginal itching and intermittent or vaginal rashes or vaginal redness. Patient is drinking 3 L each; mouth is dry; Urine output is increased yesterday and now somewhat decreased -- however she has been resting most of the day so less H2O intake. She has not had mensurated, she has a 9 month old and breastfeeding.  She states ED advised she follow-up with PCP and Urology, patient has not seen urology in the past. I made her aware I will convey the above to Dr. Jason Maldonado. She would like a call back. Patient instructed any new or worsening symptoms [reviewed] seek immediate medical attention. Patient verbalized understanding. No further questions or concerns at this time.     Dr. Jason Maldonado to advise [Dr. Jason Maldonado is also the on-call provider today]  Please reply to pool: NATAN Watson

## 2023-10-09 ENCOUNTER — OFFICE VISIT (OUTPATIENT)
Dept: FAMILY MEDICINE CLINIC | Facility: CLINIC | Age: 43
End: 2023-10-09

## 2023-10-09 VITALS
SYSTOLIC BLOOD PRESSURE: 104 MMHG | HEIGHT: 62.8 IN | TEMPERATURE: 98 F | WEIGHT: 213 LBS | DIASTOLIC BLOOD PRESSURE: 69 MMHG | RESPIRATION RATE: 16 BRPM | HEART RATE: 84 BPM | BODY MASS INDEX: 37.74 KG/M2

## 2023-10-09 DIAGNOSIS — M54.9 ACUTE LEFT-SIDED BACK PAIN, UNSPECIFIED BACK LOCATION: Primary | ICD-10-CM

## 2023-10-09 DIAGNOSIS — N12 PYELONEPHRITIS: ICD-10-CM

## 2023-10-09 PROCEDURE — 3008F BODY MASS INDEX DOCD: CPT | Performed by: FAMILY MEDICINE

## 2023-10-09 PROCEDURE — 99214 OFFICE O/P EST MOD 30 MIN: CPT | Performed by: FAMILY MEDICINE

## 2023-10-09 PROCEDURE — 3074F SYST BP LT 130 MM HG: CPT | Performed by: FAMILY MEDICINE

## 2023-10-09 PROCEDURE — 3078F DIAST BP <80 MM HG: CPT | Performed by: FAMILY MEDICINE

## 2023-10-09 RX ORDER — CYCLOBENZAPRINE HCL 10 MG
10 TABLET ORAL NIGHTLY
Qty: 30 TABLET | Refills: 0 | Status: SHIPPED | OUTPATIENT
Start: 2023-10-09

## 2023-10-10 LAB
APPEARANCE: CLEAR
BILIRUBIN: NEGATIVE
COLOR: YELLOW
GLUCOSE: NEGATIVE
KETONES: NEGATIVE
LEUKOCYTE ESTERASE: NEGATIVE
NITRITE: NEGATIVE
OCCULT BLOOD: NEGATIVE
PH: 6 (ref 5–8)
PROTEIN: NEGATIVE
SPECIFIC GRAVITY: 1 (ref 1–1.03)

## 2023-11-17 ENCOUNTER — ORDER TRANSCRIPTION (OUTPATIENT)
Dept: PHYSICAL THERAPY | Facility: HOSPITAL | Age: 43
End: 2023-11-17

## 2023-11-17 ENCOUNTER — TELEPHONE (OUTPATIENT)
Dept: PHYSICAL THERAPY | Facility: HOSPITAL | Age: 43
End: 2023-11-17

## 2023-11-17 DIAGNOSIS — M54.9 ACUTE LEFT-SIDED BACK PAIN, UNSPECIFIED BACK LOCATION: Primary | ICD-10-CM

## 2023-11-29 ENCOUNTER — OFFICE VISIT (OUTPATIENT)
Dept: FAMILY MEDICINE CLINIC | Facility: CLINIC | Age: 43
End: 2023-11-29

## 2023-11-29 VITALS
OXYGEN SATURATION: 98 % | SYSTOLIC BLOOD PRESSURE: 114 MMHG | WEIGHT: 210 LBS | RESPIRATION RATE: 19 BRPM | DIASTOLIC BLOOD PRESSURE: 81 MMHG | HEART RATE: 78 BPM | BODY MASS INDEX: 37.21 KG/M2 | HEIGHT: 62.8 IN

## 2023-11-29 DIAGNOSIS — R22.9 LOCALIZED SKIN MASS, LUMP, OR SWELLING: Primary | ICD-10-CM

## 2023-11-29 PROCEDURE — 3079F DIAST BP 80-89 MM HG: CPT | Performed by: FAMILY MEDICINE

## 2023-11-29 PROCEDURE — 3074F SYST BP LT 130 MM HG: CPT | Performed by: FAMILY MEDICINE

## 2023-11-29 PROCEDURE — 90471 IMMUNIZATION ADMIN: CPT | Performed by: FAMILY MEDICINE

## 2023-11-29 PROCEDURE — 3008F BODY MASS INDEX DOCD: CPT | Performed by: FAMILY MEDICINE

## 2023-11-29 PROCEDURE — 99213 OFFICE O/P EST LOW 20 MIN: CPT | Performed by: FAMILY MEDICINE

## 2023-11-29 PROCEDURE — 90677 PCV20 VACCINE IM: CPT | Performed by: FAMILY MEDICINE

## 2023-11-29 NOTE — PROGRESS NOTES
HPI:    Drew Hou is a 44year old female presents clinic for follow-up. Has had a superficial mass near the right upper quadrant of her abdomen. Feels is getting larger, causing more pain especially in the morning when she lays on her side. Requesting referral for removal.    HISTORY:  Past Medical History:   Diagnosis Date    Anxiety     Per pt statement    Asthma     Cough 2016    Ear problems 2016    Extrinsic asthma, unspecified     Gonorrhea     tx around 20 yrs    HPV in female       Past Surgical History:   Procedure Laterality Date    LEEP              Family History   Problem Relation Age of Onset    Diabetes Father     Diabetes Paternal Grandfather     Cancer Maternal Grandmother         Lung CA    Asthma Mother     Other (Other) Mother     No Known Problems Daughter     Cancer Maternal Grandfather     Other (Other) Paternal Grandmother         lung issue    No Known Problems Daughter       Social History:   Social History     Socioeconomic History    Marital status:    Tobacco Use    Smoking status: Never     Passive exposure: Never    Smokeless tobacco: Never   Vaping Use    Vaping Use: Never used   Substance and Sexual Activity    Alcohol use: No    Drug use: Never    Sexual activity: Yes     Partners: Male   Other Topics Concern    Blood Transfusions No        Medications (Active prior to today's visit):  Current Outpatient Medications   Medication Sig Dispense Refill    triamcinolone 0.1 % External Cream Apply topically 2 (two) times daily as needed. 60 g 0    albuterol 108 (90 Base) MCG/ACT Inhalation Aero Soln Inhale 2 puffs into the lungs every 6 (six) hours as needed for Wheezing. 18 g 0    Vitamin D, Ergocalciferol, 50 MCG (2000 UT) Oral Cap Take 1 tablet by mouth As Directed. Prenatal Vit-DSS-Fe Cbn-FA (PRENATAL AD) Oral Tab Take 1 tablet by mouth daily. Allergies:   Allergies   Allergen Reactions    Cefaclor HIVES and RASH    Ceftriaxone HIVES Pt developed hives and petechiae. Confirmed by dr. Nena Mukherjee at bedside. Depression Screening (PHQ-2/PHQ-9): Over the LAST 2 WEEKS                         ROS:   Review of Systems   All other systems reviewed and are negative. PHYSICAL EXAM:     Vitals:    11/29/23 1015   BP: 114/81   BP Location: Right arm   Patient Position: Sitting   Cuff Size: large   Pulse: 78   Resp: 19   SpO2: 98%   Weight: 210 lb (95.3 kg)   Height: 5' 2.8\" (1.595 m)     Physical Exam  Vitals reviewed. Constitutional:       General: She is not in acute distress. Cardiovascular:      Rate and Rhythm: Normal rate. Pulmonary:      Effort: Pulmonary effort is normal. No respiratory distress. Abdominal:      Comments: 2 to 3 cm soft, mobile mass palpated in right upper quadrant   Neurological:      Mental Status: She is alert. ASSESSMENT/PLAN:   (R22.9) Localized skin mass, lump, or swelling  (primary encounter diagnosis)  Plan: Surgery Referral - In Network  -Clinically appears to be a lipoma. Referred to general surgery for evaluation/removal.  Follow-up as needed. Responsible party/patient verbalized understanding of information discussed. No barriers to learning observed. Orders This Visit:  Orders Placed This Encounter   Procedures    Prevnar 20 (PCV20) [id number]       Meds This Visit:  Requested Prescriptions      No prescriptions requested or ordered in this encounter       Imaging & Referrals:  PCV20 VACCINE FOR INTRAMUSCULAR USE  SURGERY - INTERNAL       The 21st Century cures Act makes medical notes like these available to patients in the interest of transparency. However, be advised that this is a medical document. It is intended as peer to peer communication. It is written in medical language and may contain abbreviations or verbiage that are unfamiliar. It may appear blunt or direct.   Medical documents are intended to carry relevant information, facts as evident, and the clinical opinion of the practitioner. This note was created by [facility] voice recognition. Errors in content may be related to improper recognition by the system; efforts to review and correct have been done but errors may still exist. Please contact me with any questions.        11/29/2023  Demetria Barton MD

## 2023-12-13 ENCOUNTER — TELEPHONE (OUTPATIENT)
Dept: PHYSICAL THERAPY | Facility: HOSPITAL | Age: 43
End: 2023-12-13

## 2023-12-14 ENCOUNTER — TELEPHONE (OUTPATIENT)
Dept: PHYSICAL THERAPY | Facility: HOSPITAL | Age: 43
End: 2023-12-14

## 2023-12-14 ENCOUNTER — OFFICE VISIT (OUTPATIENT)
Dept: PHYSICAL THERAPY | Facility: HOSPITAL | Age: 43
End: 2023-12-14
Attending: FAMILY MEDICINE
Payer: COMMERCIAL

## 2023-12-14 DIAGNOSIS — M54.9 ACUTE LEFT-SIDED BACK PAIN, UNSPECIFIED BACK LOCATION: Primary | ICD-10-CM

## 2023-12-14 PROCEDURE — 97110 THERAPEUTIC EXERCISES: CPT

## 2023-12-14 PROCEDURE — 97161 PT EVAL LOW COMPLEX 20 MIN: CPT

## 2023-12-19 ENCOUNTER — APPOINTMENT (OUTPATIENT)
Dept: PHYSICAL THERAPY | Facility: HOSPITAL | Age: 43
End: 2023-12-19
Attending: FAMILY MEDICINE
Payer: COMMERCIAL

## 2023-12-21 ENCOUNTER — OFFICE VISIT (OUTPATIENT)
Dept: PHYSICAL THERAPY | Facility: HOSPITAL | Age: 43
End: 2023-12-21
Attending: FAMILY MEDICINE
Payer: COMMERCIAL

## 2023-12-21 PROCEDURE — 97110 THERAPEUTIC EXERCISES: CPT

## 2023-12-21 PROCEDURE — 97140 MANUAL THERAPY 1/> REGIONS: CPT

## 2023-12-21 NOTE — PROGRESS NOTES
Diagnosis:   Acute left-sided back pain, unspecified back location (M54.9)         Referring Provider: MANUEL Chaudhry Date of Evaluation:    12/14/2023    Precautions:  None, anxiety   Next MD visit:   none scheduled  Date of Surgery: n/a     Insurance Primary/Secondary: Gus Fossa / N/A     # Auth Visits: na            Subjective: R flank pain returned; patient reports this is d/t lifting her child's car seat from stroller/car. R low back/hip are feeling well today. Pain: 4/10 (R flank)   1/10 (R low back/hip)  At IE:  Pt describes pain level current 1-2/10,  at worst 8/10. Current functional limitations include bending, lifting, carrying her son (1 year), twisting, sleeping, stairs (R hip). Objective:   Taken from IE:  Observation/Posture: forward posture, forw head and rounded shoulders   Pelvis: symmetrical  Neuro Screen: slump: - B    Heel slide to shin: R *; L    Trunk AROM: (* denotes performed with pain)  Flexion: 50%*  Extension: 25%*  Sidebending: R 100%* pain in RUE as well; L 100%  Rotation: R 100%* (R hip); L 100%* (low back)    Accessory motion: mild hypomobility lumbar spine  Palpation: TTP R glut, lower lumbar (mild), periscap B    Strength: (* denotes performed with pain)  LE   Hip flexion (L2): R 4-/5; L 4-/5  Hip abduction: R 3/5; L 3/5  Hip Extension: R 3/5; L 3-/5     Knee Flexion: R 4+/5; L 4+/5   Knee extension (L3): R 4+/5; L 4+/5        Flexibility:   LE   Hip Flexor: R WFL, L WNL  Hamstrings: R WFL; L WFL  Piriformis: R mod*; L min       Special tests:   Repeated Motions Testing: extension preference JEREMY  Shift Correction: n/a    SI Cluster:  NT d/t time constraints    Gait: pt ambulates on level ground with normal mechanics. Assessment: R rib and intercostal tenderness; likely exacerbated by lifting car seat. Decreased intensity of pain with manual intervention to reduce muscle tightness.  Will progress strengthening to improve support to flank and lumbar spine/SI as tolerated next session. Goals:   Goals: (to be met in 8-10 visits)   Pt will improve transversus abdominis recruitment to perform proper isometric contraction without requiring verbal or tactile cuing to promote advancement of therex   Pt will demonstrate good understanding of proper posture and body mechanics to decrease pain and improve spinal safety   Pt will improve lumbar spine AROM flexion to allow increase ease with bending forward to don shoes   Pt will report improved symptom centralization and absence of radicular symptoms for 3 consecutive days to improve function with ADL   Pt will demonstrate improved core strength to be able to carry her child with <2/10 pain   Pt will be independent and compliant with comprehensive HEP to maintain progress achieved in PT     Plan: Patient will be seen for 2 x/week or a total of 8-10 visits over a 90 day period. Treatment will include: Manual Therapy, Neuromuscular Re-education, Therapeutic Activities, Therapeutic Exercise, and Home Exercise Program instruction  Consider band exercises and core strength (for flank)  Date: 12/21/2023  TX#: 2/8-10 Date:                 TX#: 3/ Date:                 TX#: 4/ Date:                 TX#: 5/ Date:    Tx#: 6/   MT: 30 mins  - MFR/STM intercostals R, lumbar paraspinals R, glutes  - lumbar R UPA and R mid-lower ribs Gr II-III  - gentle SI mob       TE: 10 mins  - inverse clamshells, x20 R  - clamshells, x20 R  - s/l open book oscillations, x10 R  - open book, x10 R (inhale with open, exhale with close)  - narrow chest press, 5# annie, x20        TA: 5 mins  - review strategies for lifting, car seat               HEP: domonique GODDARD  12/21/23: open book    Charges: 2MT, 1TE       Total Timed Treatment: 45 min  Total Treatment Time: 45 min

## 2023-12-29 ENCOUNTER — OFFICE VISIT (OUTPATIENT)
Dept: PHYSICAL THERAPY | Facility: HOSPITAL | Age: 43
End: 2023-12-29
Attending: FAMILY MEDICINE
Payer: COMMERCIAL

## 2023-12-29 PROCEDURE — 97110 THERAPEUTIC EXERCISES: CPT

## 2023-12-29 PROCEDURE — 97140 MANUAL THERAPY 1/> REGIONS: CPT

## 2023-12-29 NOTE — PROGRESS NOTES
Diagnosis:   Acute left-sided back pain, unspecified back location (M54.9)         Referring Provider: MANUEL Bush Date of Evaluation:    12/14/2023    Precautions:  None, anxiety   Next MD visit:   none scheduled  Date of Surgery: n/a     Insurance Primary/Secondary: [employer] Ray / N/A     # Auth Visits: na            Subjective: Has not continued with R flank pain but has not had to lift baby carrier since previous visit. Back is doing well. Intermittent foot pain. Pain: 1/10 (R flank)   1/10 (R low back/hip)  At IE:  Pt describes pain level current 1-2/10,  at worst 8/10. Current functional limitations include bending, lifting, carrying her son (1 year), twisting, sleeping, stairs (R hip). Objective:   Taken from IE:  Observation/Posture: forward posture, forw head and rounded shoulders   Pelvis: symmetrical  Neuro Screen: slump: - B    Heel slide to shin: R *; L    Trunk AROM: (* denotes performed with pain)  Flexion: 50%*  Extension: 25%*  Sidebending: R 100%* pain in RUE as well; L 100%  Rotation: R 100%* (R hip); L 100%* (low back)    Accessory motion: mild hypomobility lumbar spine  Palpation: TTP R glut, lower lumbar (mild), periscap B    Strength: (* denotes performed with pain)  LE   Hip flexion (L2): R 4-/5; L 4-/5  Hip abduction: R 3/5; L 3/5  Hip Extension: R 3/5; L 3-/5     Knee Flexion: R 4+/5; L 4+/5   Knee extension (L3): R 4+/5; L 4+/5        Flexibility:   LE   Hip Flexor: R WFL, L WNL  Hamstrings: R WFL; L WFL  Piriformis: R mod*; L min       Special tests:   Repeated Motions Testing: extension preference JEREMY  Shift Correction: n/a    SI Cluster:  NT d/t time constraints    Gait: pt ambulates on level ground with normal mechanics. Assessment: Fatigues quickly with supine/sidelying and standing exercises demonstrating significant weakness.  Prox LE and core strengthening would bene beneficial to improve offloading of lumbar spine and increased tolerance to upright activities such as standing and lifting. Updated HEP; patient will continue to work on HEP strengthening while on vacation. Will resume PT upon return from vacation. Goals:   Goals: (to be met in 8-10 visits)   Pt will improve transversus abdominis recruitment to perform proper isometric contraction without requiring verbal or tactile cuing to promote advancement of therex   Pt will demonstrate good understanding of proper posture and body mechanics to decrease pain and improve spinal safety   Pt will improve lumbar spine AROM flexion to allow increase ease with bending forward to don shoes   Pt will report improved symptom centralization and absence of radicular symptoms for 3 consecutive days to improve function with ADL   Pt will demonstrate improved core strength to be able to carry her child with <2/10 pain   Pt will be independent and compliant with comprehensive HEP to maintain progress achieved in PT     Plan: Patient will be seen for 2 x/week or a total of 8-10 visits over a 90 day period. Treatment will include: Manual Therapy, Neuromuscular Re-education, Therapeutic Activities, Therapeutic Exercise, and Home Exercise Program instruction  Consider band exercises and core strength (for flank)  Date: 12/21/2023  TX#: 2/8-10 Date: 12/29/23                TX#: 3/8-10 Date:                 TX#: 4/ Date:                 TX#: 5/ Date:    Tx#: 6/   MT: 30 mins  - MFR/STM intercostals R, lumbar paraspinals R, glutes  - lumbar R UPA and R mid-lower ribs Gr II-III  - gentle SI mob MT: 10 mins  - MFR/STM R paraspinals and glutes  - gentle lumbar PA and UPA R        TE: 10 mins  - inverse clamshells, x20 R  - clamshells, x20 R  - s/l open book oscillations, x10 R  - open book, x10 R (inhale with open, exhale with close)  - narrow chest press, 5# annie, x20  TE: 25 mins  - edu plantar fascia mob and calf stretch  - s/l open book + oscillations, x10 B  - 4pt rock back, x10  - 4pt cat/cow, x10  - clamshells, 3#, 2x10 R  - inverse clamshells, 3#, x20 R  - lateral stepping, x2 short laps  - review HEP        TA: 5 mins  - review strategies for lifting, car seat  NR: 3 mins  - scap setting in s/l, x8 R             HEP: domonique GODDARD  12/21/23: open book  - 4pt rock back, cat/cow, open book; clamshells, inverse clamshells, lateral stepping  Charges: 1MT, 2TE       Total Timed Treatment: 38 min  Total Treatment Time: 38 min

## 2024-01-17 ENCOUNTER — OFFICE VISIT (OUTPATIENT)
Dept: PHYSICAL THERAPY | Facility: HOSPITAL | Age: 44
End: 2024-01-17
Attending: FAMILY MEDICINE
Payer: COMMERCIAL

## 2024-01-17 PROCEDURE — 97110 THERAPEUTIC EXERCISES: CPT

## 2024-01-17 PROCEDURE — 97112 NEUROMUSCULAR REEDUCATION: CPT

## 2024-01-17 PROCEDURE — 97140 MANUAL THERAPY 1/> REGIONS: CPT

## 2024-01-17 NOTE — PROGRESS NOTES
Diagnosis:   Acute left-sided back pain, unspecified back location (M54.9)         Referring Provider: MANUEL Pelletier Date of Evaluation:    12/14/2023    Precautions:  None, anxiety   Next MD visit:   none scheduled  Date of Surgery: n/a     Insurance Primary/Secondary: [employer] / N/A     # Auth Visits: na            Subjective: Returns to PT after vacation. Feeling well during vacation then felt increased pain upon coming home. Wonders if her mattress is affecting her back. Felt pain when she woke up today (3-4/10) \"more annoying than painful\" but pain decreases with movement.     Pain: 0/10 (R flank)   0-1/10 (R low back/hip) \"no pain just stretching\"  At IE:  Pt describes pain level current 1-2/10,  at worst 8/10.   Current functional limitations include bending, lifting, carrying her son (1 year), twisting, sleeping, stairs (R hip).     Objective:   Taken from IE:  Observation/Posture: forward posture, forw head and rounded shoulders   Pelvis: symmetrical  Neuro Screen: slump: - B    Heel slide to shin: R *; L    Trunk AROM: (* denotes performed with pain)  Flexion: 50%*  Extension: 25%*  Sidebending: R 100%* pain in RUE as well; L 100%  Rotation: R 100%* (R hip); L 100%* (low back)    Accessory motion: mild hypomobility lumbar spine  Palpation: TTP R glut, lower lumbar (mild), periscap B    Strength: (* denotes performed with pain)  LE   Hip flexion (L2): R 4-/5; L 4-/5  Hip abduction: R 3/5; L 3/5  Hip Extension: R 3/5; L 3-/5     Knee Flexion: R 4+/5; L 4+/5   Knee extension (L3): R 4+/5; L 4+/5        Flexibility:   LE   Hip Flexor: R WFL, L WNL  Hamstrings: R WFL; L WFL  Piriformis: R mod*; L min       Special tests:   Repeated Motions Testing: extension preference JEREMY  Shift Correction: n/a    SI Cluster:  NT d/t time constraints    Gait: pt ambulates on level ground with normal mechanics.    Assessment: Pain with TrA activation and fatigue with prox core/hip stability exercises. Would  benefit from continued strengthening to improve tolerance to upright activity needed for daily tasks.      Goals:   Goals: (to be met in 8-10 visits)   Pt will improve transversus abdominis recruitment to perform proper isometric contraction without requiring verbal or tactile cuing to promote advancement of therex   Pt will demonstrate good understanding of proper posture and body mechanics to decrease pain and improve spinal safety   Pt will improve lumbar spine AROM flexion to allow increase ease with bending forward to don shoes   Pt will report improved symptom centralization and absence of radicular symptoms for 3 consecutive days to improve function with ADL   Pt will demonstrate improved core strength to be able to carry her child with <2/10 pain   Pt will be independent and compliant with comprehensive HEP to maintain progress achieved in PT     Plan: Patient will be seen for 2 x/week or a total of 8-10 visits over a 90 day period. Treatment will include: Manual Therapy, Neuromuscular Re-education, Therapeutic Activities, Therapeutic Exercise, and Home Exercise Program instruction  Consider band exercises and core strength (for flank); continue core stability (4pt, SB)  Date: 12/21/2023  TX#: 2/8-10 Date: 12/29/23                TX#: 3/8-10 Date:  1/17/24               TX#: 4/8-10 Date:                 TX#: 5/ Date:   Tx#: 6/   MT: 30 mins  - MFR/STM intercostals R, lumbar paraspinals R, glutes  - lumbar R UPA and R mid-lower ribs Gr II-III  - gentle SI mob MT: 10 mins  - MFR/STM R paraspinals and glutes  - gentle lumbar PA and UPA R   MT: 10 mins  - gentle lumbar gapping in s/l  - STM R glutes and paraspinals     TE: 10 mins  - inverse clamshells, x20 R  - clamshells, x20 R  - s/l open book oscillations, x10 R  - open book, x10 R (inhale with open, exhale with close)  - narrow chest press, 5# annie, x20  TE: 25 mins  - edu plantar fascia mob and calf stretch  - s/l open book + oscillations, x10 B  - 4pt rock  back, x10  - 4pt cat/cow, x10  - clamshells, 3#, 2x10 R  - inverse clamshells, 3#, x20 R  - lateral stepping, x2 short laps  - review HEP   TE: 23 mins  - seated SB roll forw, x10  - 4pt rock back, x10  - 4 pt cat/cow, x10   - clamshells, 2#, x20 R  - reverse clamshells, 2#, x20 R  - lateral stepping, x3 laps     TA: 5 mins  - review strategies for lifting, car seat  NR: 3 mins  - scap setting in s/l, x8 R NR: 10 mins  - 4pt UE lift, x10 alt B  - 4 pt bird dog, x5 alt B  - 4pt LE lift, x5 alt B,            HEP: PPdomonique ROSAS  12/21/23: open book  - 4pt rock back, cat/cow, open book; clamshells, inverse clamshells, lateral stepping  1/17/24: may add bird dog or 4pt LE lift with TrA engagement   Charges: 1MT, 2TE, 1NR       Total Timed Treatment: 43 min  Total Treatment Time: 43 min

## 2024-01-24 ENCOUNTER — APPOINTMENT (OUTPATIENT)
Dept: PHYSICAL THERAPY | Facility: HOSPITAL | Age: 44
End: 2024-01-24
Attending: FAMILY MEDICINE
Payer: COMMERCIAL

## 2024-01-31 ENCOUNTER — OFFICE VISIT (OUTPATIENT)
Dept: PHYSICAL THERAPY | Facility: HOSPITAL | Age: 44
End: 2024-01-31
Attending: FAMILY MEDICINE
Payer: COMMERCIAL

## 2024-01-31 PROCEDURE — 97110 THERAPEUTIC EXERCISES: CPT

## 2024-01-31 PROCEDURE — 97140 MANUAL THERAPY 1/> REGIONS: CPT

## 2024-01-31 NOTE — PROGRESS NOTES
Diagnosis:   Acute left-sided back pain, unspecified back location (M54.9)         Referring Provider: MANUEL Pelletier Date of Evaluation:    12/14/2023    Precautions:  None, anxiety   Next MD visit:   none scheduled  Date of Surgery: n/a     Insurance Primary/Secondary: [employer] / N/A     # Auth Visits: na            Subjective: Ribs, low back are feeling well, \"hip is okay.\" Main complaint is upper back/neck and R heel.     Pain: 0/10     At IE:  Pt describes pain level current 1-2/10,  at worst 8/10.   Current functional limitations include bending, lifting, carrying her son (1 year), twisting, sleeping, stairs (R hip).     Objective:     ----------  Taken from IE:  Observation/Posture: forward posture, forw head and rounded shoulders   Pelvis: symmetrical  Neuro Screen: slump: - B    Heel slide to shin: R *; L    Trunk AROM: (* denotes performed with pain)  Flexion: 50%*  Extension: 25%*  Sidebending: R 100%* pain in RUE as well; L 100%  Rotation: R 100%* (R hip); L 100%* (low back)    Accessory motion: mild hypomobility lumbar spine  Palpation: TTP R glut, lower lumbar (mild), periscap B    Strength: (* denotes performed with pain)  LE   Hip flexion (L2): R 4-/5; L 4-/5  Hip abduction: R 3/5; L 3/5  Hip Extension: R 3/5; L 3-/5     Knee Flexion: R 4+/5; L 4+/5   Knee extension (L3): R 4+/5; L 4+/5        Flexibility:   LE   Hip Flexor: R WFL, L WNL  Hamstrings: R WFL; L WFL  Piriformis: R mod*; L min       Special tests:   Repeated Motions Testing: extension preference JEREMY  Shift Correction: n/a    SI Cluster:  NT d/t time constraints    Gait: pt ambulates on level ground with normal mechanics.    Assessment: Pt reports decreased pain intensity with movement. R shoulder clicking and pain with scap retraction; may benefit from further investigation into shoulder with MD. Decreased heel pain after calf stretching; reviewed stretches for HEP. LBP improved with 4pt rocking.       Goals:   Goals: (to be  met in 8-10 visits)   Pt will improve transversus abdominis recruitment to perform proper isometric contraction without requiring verbal or tactile cuing to promote advancement of therex   Pt will demonstrate good understanding of proper posture and body mechanics to decrease pain and improve spinal safety   Pt will improve lumbar spine AROM flexion to allow increase ease with bending forward to don shoes   Pt will report improved symptom centralization and absence of radicular symptoms for 3 consecutive days to improve function with ADL   Pt will demonstrate improved core strength to be able to carry her child with <2/10 pain   Pt will be independent and compliant with comprehensive HEP to maintain progress achieved in PT     Plan: Patient will be seen for 2 x/week or a total of 8-10 visits over a 90 day period. Treatment will include: Manual Therapy, Neuromuscular Re-education, Therapeutic Activities, Therapeutic Exercise, and Home Exercise Program instruction  Consider band exercises and core strength (for flank); continue core stability (4pt, SB)  Date: 12/21/2023  TX#: 2/8-10 Date: 12/29/23                TX#: 3/8-10 Date:  1/17/24               TX#: 4/8-10 Date:   1/31/24              TX#: 5/8-10 Date:   Tx#: 6/   MT: 30 mins  - MFR/STM intercostals R, lumbar paraspinals R, glutes  - lumbar R UPA and R mid-lower ribs Gr II-III  - gentle SI mob MT: 10 mins  - MFR/STM R paraspinals and glutes  - gentle lumbar PA and UPA R   MT: 10 mins  - gentle lumbar gapping in s/l  - STM R glutes and paraspinals MT: 8 mins  - lumbar gapping  - STM glutes, paraspinals  - s/l upper thoracic PA Gr II-III    TE: 10 mins  - inverse clamshells, x20 R  - clamshells, x20 R  - s/l open book oscillations, x10 R  - open book, x10 R (inhale with open, exhale with close)  - narrow chest press, 5# annie, x20  TE: 25 mins  - edu plantar fascia mob and calf stretch  - s/l open book + oscillations, x10 B  - 4pt rock back, x10  - 4pt cat/cow,  x10  - clamshells, 3#, 2x10 R  - inverse clamshells, 3#, x20 R  - lateral stepping, x2 short laps  - review HEP   TE: 23 mins  - seated SB roll forw, x10  - 4pt rock back, x10  - 4 pt cat/cow, x10   - clamshells, 2#, x20 R  - reverse clamshells, 2#, x20 R  - lateral stepping, x3 laps TE: 23 mins  - standing calf stretch (juan francisco, off 1st step, ground), x30\"-1' ea  - open book, x10 R  - clamshells, x15 R  - cat/cow, x10  - cerv ret, x10  - scap ret, x5 B    TA: 5 mins  - review strategies for lifting, car seat  NR: 3 mins  - scap setting in s/l, x8 R NR: 10 mins  - 4pt UE lift, x10 alt B  - 4 pt bird dog, x5 alt B  - 4pt LE lift, x5 alt B,            HEP: PPU, bridges  12/21/23: open book  - 4pt rock back, cat/cow, open book; clamshells, inverse clamshells, lateral stepping  1/17/24: may add bird dog or 4pt LE lift with TrA engagement   1/31/24: con't 4pt exercises, add calf stretch  Charges: 1MT, 2TE       Total Timed Treatment: 31 min  Total Treatment Time: 31 min

## 2024-02-07 ENCOUNTER — OFFICE VISIT (OUTPATIENT)
Dept: PHYSICAL THERAPY | Facility: HOSPITAL | Age: 44
End: 2024-02-07
Attending: FAMILY MEDICINE
Payer: COMMERCIAL

## 2024-02-07 PROCEDURE — 97140 MANUAL THERAPY 1/> REGIONS: CPT

## 2024-02-07 PROCEDURE — 97110 THERAPEUTIC EXERCISES: CPT

## 2024-02-07 PROCEDURE — 97112 NEUROMUSCULAR REEDUCATION: CPT

## 2024-02-07 NOTE — PROGRESS NOTES
Diagnosis:   Acute left-sided back pain, unspecified back location (M54.9)         Referring Provider: MANUEL Pelletier Date of Evaluation:    12/14/2023    Precautions:  None, anxiety   Next MD visit:   none scheduled  Date of Surgery: n/a     Insurance Primary/Secondary: [employer] / N/A     # Auth Visits: na            Subjective: Feeling well overall. Minimal pain R hip. Mostly pain in L low back/hip after carrying her middle daughter (approx 30#) in the parking lot about 1/2 a block. L low back/hip flared up after that; did some stretches which felt better. Cleaned her house a lot and had a photo shoot without issue. Was very sore after prev session; has not done HEP since d/t soreness.    Pain: 2/10 (L low back/hip)     At IE:  Pt describes pain level current 1-2/10,  at worst 8/10.   Current functional limitations include bending, lifting, carrying her son (1 year), twisting, sleeping, stairs (R hip).     Objective:     ----------  Taken from IE:  Observation/Posture: forward posture, forw head and rounded shoulders   Pelvis: symmetrical  Neuro Screen: slump: - B    Heel slide to shin: R *; L    Trunk AROM: (* denotes performed with pain)  Flexion: 50%*  Extension: 25%*  Sidebending: R 100%* pain in RUE as well; L 100%  Rotation: R 100%* (R hip); L 100%* (low back)    Accessory motion: mild hypomobility lumbar spine  Palpation: TTP R glut, lower lumbar (mild), periscap B    Strength: (* denotes performed with pain)  LE   Hip flexion (L2): R 4-/5; L 4-/5  Hip abduction: R 3/5; L 3/5  Hip Extension: R 3/5; L 3-/5     Knee Flexion: R 4+/5; L 4+/5   Knee extension (L3): R 4+/5; L 4+/5        Flexibility:   LE   Hip Flexor: R WFL, L WNL  Hamstrings: R WFL; L WFL  Piriformis: R mod*; L min       Special tests:   Repeated Motions Testing: extension preference JEREMY  Shift Correction: n/a    SI Cluster:  NT d/t time constraints    Gait: pt ambulates on level ground with normal mechanics.    Assessment: R flank  pain flared up with bird dog stabilization; will continue to work on strength for stability. Added to HEP. Otherwise progressing well with good ROM and minimal pain.      Goals:   Goals: (to be met in 8-10 visits)   Pt will improve transversus abdominis recruitment to perform proper isometric contraction without requiring verbal or tactile cuing to promote advancement of therex   Pt will demonstrate good understanding of proper posture and body mechanics to decrease pain and improve spinal safety   Pt will improve lumbar spine AROM flexion to allow increase ease with bending forward to don shoes   Pt will report improved symptom centralization and absence of radicular symptoms for 3 consecutive days to improve function with ADL   Pt will demonstrate improved core strength to be able to carry her child with <2/10 pain   Pt will be independent and compliant with comprehensive HEP to maintain progress achieved in PT     Plan: Patient will be seen for 2 x/week or a total of 8-10 visits over a 90 day period. Treatment will include: Manual Therapy, Neuromuscular Re-education, Therapeutic Activities, Therapeutic Exercise, and Home Exercise Program instruction  Consider band exercises and core strength (for flank); continue core stability (4pt, SB); consider trial NuStep  Date: 12/21/2023  TX#: 2/8-10 Date: 12/29/23                TX#: 3/8-10 Date:  1/17/24               TX#: 4/8-10 Date:   1/31/24              TX#: 5/8-10 Date: 2/7/24   Tx#: 6/8-10   MT: 30 mins  - MFR/STM intercostals R, lumbar paraspinals R, glutes  - lumbar R UPA and R mid-lower ribs Gr II-III  - gentle SI mob MT: 10 mins  - MFR/STM R paraspinals and glutes  - gentle lumbar PA and UPA R   MT: 10 mins  - gentle lumbar gapping in s/l  - STM R glutes and paraspinals MT: 8 mins  - lumbar gapping  - STM glutes, paraspinals  - s/l upper thoracic PA Gr II-III MT: 10 mins  - lumbar gapping  - STM glutes, paraspinals   TE: 10 mins  - inverse clamshells, x20 R  -  clamshells, x20 R  - s/l open book oscillations, x10 R  - open book, x10 R (inhale with open, exhale with close)  - narrow chest press, 5# annie, x20  TE: 25 mins  - edu plantar fascia mob and calf stretch  - s/l open book + oscillations, x10 B  - 4pt rock back, x10  - 4pt cat/cow, x10  - clamshells, 3#, 2x10 R  - inverse clamshells, 3#, x20 R  - lateral stepping, x2 short laps  - review HEP   TE: 23 mins  - seated SB roll forw, x10  - 4pt rock back, x10  - 4 pt cat/cow, x10   - clamshells, 2#, x20 R  - reverse clamshells, 2#, x20 R  - lateral stepping, x3 laps TE: 23 mins  - standing calf stretch (juan francisco, off 1st step, ground), x30\"-1' ea  - open book, x10 R  - clamshells, x15 R  - cat/cow, x10  - cerv ret, x10  - scap ret, x5 B TE: 23 mins  - FF SB roll, x15  - clamshells, x20 B  - open book, x10 B  - reverse clamshells, x15 B  - child's pose     TA: 5 mins  - review strategies for lifting, car seat  NR: 3 mins  - scap setting in s/l, x8 R NR: 10 mins  - 4pt UE lift, x10 alt B  - 4 pt bird dog, x5 alt B  - 4pt LE lift, x5 alt B,  NR: 10 mins  - STS + SB raise overhead, x15  - seated SB march, x10 B  - 4pt bird dog, x8 B (difficult R flank/ribs)          HEP: PPU, bridges  12/21/23: open book  - 4pt rock back, cat/cow, open book; clamshells, inverse clamshells, lateral stepping  1/17/24: may add bird dog or 4pt LE lift with TrA engagement   1/31/24: con't 4pt exercises, add calf stretch  2/7/24: bird dog  Charges: 1MT, 2TE, 1NR       Total Timed Treatment: 43 min  Total Treatment Time: 43 min

## 2024-02-14 ENCOUNTER — OFFICE VISIT (OUTPATIENT)
Dept: PHYSICAL THERAPY | Facility: HOSPITAL | Age: 44
End: 2024-02-14
Attending: FAMILY MEDICINE
Payer: COMMERCIAL

## 2024-02-14 PROCEDURE — 97110 THERAPEUTIC EXERCISES: CPT

## 2024-02-14 PROCEDURE — 97112 NEUROMUSCULAR REEDUCATION: CPT

## 2024-02-14 NOTE — PROGRESS NOTES
Diagnosis:   Acute left-sided back pain, unspecified back location (M54.9)         Referring Provider: MANUEL Pelletier Date of Evaluation:    12/14/2023    Precautions:  None, anxiety   Next MD visit:   none scheduled  Date of Surgery: n/a     Insurance Primary/Secondary: [employer] INS / N/A     # Auth Visits: na            Subjective: Reports her R hip has been \"terrible\" after sitting on hard ground.  Low back has been okay.   Pain: 1/10 (low back)  2/10 (R hip)    At IE:  Pt describes pain level current 1-2/10,  at worst 8/10.   Current functional limitations include bending, lifting, carrying her son (1 year), twisting, sleeping, stairs (R hip).     Objective:       Trunk AROM: (* denotes performed with pain)  Flexion: 75% (pain in R knee)  Extension: 100%  Sidebending: R 100%* R low back and L flank ; L 100%* Llow back and R flank  Rotation: R 100% (R hip); L 100%      Strength: (* denotes performed with pain)  LE   Hip flexion (L2): R 4-/5; L 4-/5  Hip abduction: R 3/5; L 3/5  Hip Extension: R 3/5; L 3-/5     Knee Flexion: R 4+/5; L 4+/5   Knee extension (L3): R 4+/5; L 4+/5        Flexibility:   LE   Hip Flexor: R WFL, L WNL  Hamstrings: R WFL; L WFL  Piriformis: R mod*; L min       Special tests:   Repeated Motions Testing: extension preference JEREMY  Shift Correction: n/a    SI Cluster:  NT d/t time constraints    Gait: pt ambulates on level ground with normal mechanics.    Assessment: Hip and back pain decreased intra-session with gentle stretching and self-mobilization. Pt will continue independently for the next 1-2 weeks and return to follow-up.     Goals:   Goals: (to be met in 8-10 visits)   Pt will improve transversus abdominis recruitment to perform proper isometric contraction without requiring verbal or tactile cuing to promote advancement of therex   Pt will demonstrate good understanding of proper posture and body mechanics to decrease pain and improve spinal safety   Pt will improve lumbar  spine AROM flexion to allow increase ease with bending forward to don shoes   Pt will report improved symptom centralization and absence of radicular symptoms for 3 consecutive days to improve function with ADL   Pt will demonstrate improved core strength to be able to carry her child with <2/10 pain   Pt will be independent and compliant with comprehensive HEP to maintain progress achieved in PT     Plan: Patient will be seen for 2 x/week or a total of 8-10 visits over a 90 day period. Treatment will include: Manual Therapy, Neuromuscular Re-education, Therapeutic Activities, Therapeutic Exercise, and Home Exercise Program instruction  Date: 12/21/2023  TX#: 2/8-10 Date: 12/29/23                TX#: 3/8-10 Date:  1/17/24               TX#: 4/8-10 Date:   1/31/24              TX#: 5/8-10 Date: 2/7/24   Tx#: 6/8-10 Date: 2/14/24  Tx: 7/8-10   MT: 30 mins  - MFR/STM intercostals R, lumbar paraspinals R, glutes  - lumbar R UPA and R mid-lower ribs Gr II-III  - gentle SI mob MT: 10 mins  - MFR/STM R paraspinals and glutes  - gentle lumbar PA and UPA R   MT: 10 mins  - gentle lumbar gapping in s/l  - STM R glutes and paraspinals MT: 8 mins  - lumbar gapping  - STM glutes, paraspinals  - s/l upper thoracic PA Gr II-III MT: 10 mins  - lumbar gapping  - STM glutes, paraspinals    TE: 10 mins  - inverse clamshells, x20 R  - clamshells, x20 R  - s/l open book oscillations, x10 R  - open book, x10 R (inhale with open, exhale with close)  - narrow chest press, 5# annie, x20  TE: 25 mins  - edu plantar fascia mob and calf stretch  - s/l open book + oscillations, x10 B  - 4pt rock back, x10  - 4pt cat/cow, x10  - clamshells, 3#, 2x10 R  - inverse clamshells, 3#, x20 R  - lateral stepping, x2 short laps  - review HEP   TE: 23 mins  - seated SB roll forw, x10  - 4pt rock back, x10  - 4 pt cat/cow, x10   - clamshells, 2#, x20 R  - reverse clamshells, 2#, x20 R  - lateral stepping, x3 laps TE: 23 mins  - standing calf stretch (juan francisco  off 1st step, ground), x30\"-1' ea  - open book, x10 R  - clamshells, x15 R  - cat/cow, x10  - cerv ret, x10  - scap ret, x5 B TE: 23 mins  - FF SB roll, x15  - clamshells, x20 B  - open book, x10 B  - reverse clamshells, x15 B  - child's pose   TE: 23 mins  - reassessment  - standing HF stretch, B (stair and floor - floor better stretch)  - seated figure 4 stretch, 2x1' B  - lateral stepping, x3 laps   TA: 5 mins  - review strategies for lifting, car seat  NR: 3 mins  - scap setting in s/l, x8 R NR: 10 mins  - 4pt UE lift, x10 alt B  - 4 pt bird dog, x5 alt B  - 4pt LE lift, x5 alt B,  NR: 10 mins  - STS + SB raise overhead, x15  - seated SB march, x10 B  - 4pt bird dog, x8 B (difficult R flank/ribs) NR: 15 mins  - LTR, x10  - BKFO, x10 B (TrA)  - h/l heel slides, x10 B (TrA)           HEP: PPU, bridges  12/21/23: open book  - 4pt rock back, cat/cow, open book; clamshells, inverse clamshells, lateral stepping  1/17/24: may add bird dog or 4pt LE lift with TrA engagement   1/31/24: con't 4pt exercises, add calf stretch  2/7/24: bird dog  Charges:  2TE, 1NR       Total Timed Treatment: 38 min  Total Treatment Time: 38 min

## 2024-03-06 ENCOUNTER — APPOINTMENT (OUTPATIENT)
Dept: PHYSICAL THERAPY | Facility: HOSPITAL | Age: 44
End: 2024-03-06
Attending: FAMILY MEDICINE
Payer: COMMERCIAL

## 2024-04-03 ENCOUNTER — OFFICE VISIT (OUTPATIENT)
Dept: SURGERY | Facility: CLINIC | Age: 44
End: 2024-04-03

## 2024-04-03 DIAGNOSIS — D17.1 LIPOMA OF TORSO: Primary | ICD-10-CM

## 2024-04-03 PROCEDURE — 99203 OFFICE O/P NEW LOW 30 MIN: CPT | Performed by: SURGERY

## 2024-04-03 NOTE — H&P
History and Physical      HPI     Chief Complaint   Patient presents with    Lump     Pt referred by Dr. Flores re: lipoma on right torso.       HPI  Gayla Powers is a 44 year old female who presents with lipoma of the torso    Past Medical History:   Diagnosis Date    Anxiety     Per pt statement    Asthma (HCC)     Cough 2016    Ear problems 2016    Extrinsic asthma, unspecified     Gonorrhea     tx around 20 yrs    HPV in female      Past Surgical History:   Procedure Laterality Date    LEEP             Current Outpatient Medications   Medication Sig Dispense Refill    triamcinolone 0.1 % External Cream Apply topically 2 (two) times daily as needed. 60 g 0    albuterol 108 (90 Base) MCG/ACT Inhalation Aero Soln Inhale 2 puffs into the lungs every 6 (six) hours as needed for Wheezing. 18 g 0    Vitamin D, Ergocalciferol, 50 MCG (2000 UT) Oral Cap Take 1 tablet by mouth As Directed.      Prenatal Vit-DSS-Fe Cbn-FA (PRENATAL AD) Oral Tab Take 1 tablet by mouth daily.       ALLERGIES  Allergies   Allergen Reactions    Cefaclor HIVES and RASH    Ceftriaxone HIVES     Pt developed hives and petechiae. Confirmed by dr. Angulo at bedside.       Social History     Socioeconomic History    Marital status:    Tobacco Use    Smoking status: Never     Passive exposure: Never    Smokeless tobacco: Never   Vaping Use    Vaping Use: Never used   Substance and Sexual Activity    Alcohol use: No    Drug use: Never    Sexual activity: Yes     Partners: Male     Family History   Problem Relation Age of Onset    Diabetes Father     Diabetes Paternal Grandfather     Cancer Maternal Grandmother         Lung CA    Asthma Mother     Other (Other) Mother     No Known Problems Daughter     Cancer Maternal Grandfather     Other (Other) Paternal Grandmother         lung issue    No Known Problems Daughter        Review of Systems   A comprehensive 10 point review of systems was completed.  Pertinent positives  and negatives noted in the the HPI.    PHYSICAL EXAM   LMP 02/07/2022 (Exact Date)  Patient's last menstrual period was 02/07/2022 (exact date).   Constitutional: appears well hydrated alert and responsive no acute distress noted  Head/Face: normocephalic  Nose/Mouth/Throat: nose and throat are clear palate is intact mucous membranes are moist no oral lesions are noted  Neck/Thyroid: neck is supple without adenopathy  Respiratory: normal to inspection lungs are clear to auscultation bilaterally normal respiratory effort  Cardiovascular: regular rate and rhythm no murmurs, gallups, or rubs  Abdomen: soft non-tender non-distended no organomegaly noted no masses  Extremities: no edema, cyanosis, or clubbing  Neurological: exam appropriate for age reflexes and motor skills appropriate for age    Right torso lipoma measuring 6 cm along the costal margin  ASSESSMENT/PLAN   Assessment   Encounter Diagnosis   Name Primary?    Lipoma of torso Yes       44 year old female with lipoma of the torso  We have discussed the surgical risks, benefits, alternatives, and expected recovery. We will plan excision lipoma same-day surgery. All of the patient's questions have been answered to her satisfaction.       4/3/2024  Gee Ansari MD

## 2024-06-14 ENCOUNTER — OFFICE VISIT (OUTPATIENT)
Dept: OBGYN CLINIC | Facility: CLINIC | Age: 44
End: 2024-06-14
Payer: COMMERCIAL

## 2024-06-14 VITALS
HEIGHT: 62.8 IN | BODY MASS INDEX: 36.82 KG/M2 | WEIGHT: 207.81 LBS | DIASTOLIC BLOOD PRESSURE: 66 MMHG | SYSTOLIC BLOOD PRESSURE: 114 MMHG

## 2024-06-14 DIAGNOSIS — Z01.419 ENCOUNTER FOR ANNUAL ROUTINE GYNECOLOGICAL EXAMINATION: Primary | ICD-10-CM

## 2024-06-14 DIAGNOSIS — O99.210 OBESITY AFFECTING PREGNANCY, ANTEPARTUM, UNSPECIFIED OBESITY TYPE (HCC): ICD-10-CM

## 2024-06-14 PROCEDURE — 99396 PREV VISIT EST AGE 40-64: CPT | Performed by: OBSTETRICS & GYNECOLOGY

## 2024-06-14 NOTE — PROGRESS NOTES
ANNUAL GYN EXAM  [facility]    CHIEF COMPLAINT:    Chief Complaint   Patient presents with    Annual      HISTORY OF PRESENT ILLNESS:   Gayla Powers is a 44 year old female   who presents for annual well woman visit.  She is feeling well without complaints.    She is still breastfeeding.  Had episode right knee swelling, but resolved.  Also episode of blurry vision, but resolved with hydration.  PAST GYNECOLOGICAL HISTORY & OTHER PREVENTIVE MEDICINE  LMP: Patient's last menstrual period was 2024.  Menarche: 17 years (2024 12:46 PM)  Period Cycle (Days): irregular (2024 12:46 PM)  Period Duration (Days): s (2024 12:46 PM)  Use of Birth Control (if yes, specify type): Vasectomy (2024 12:46 PM)  Hx Prior Abnormal Pap: Yes (2024 12:46 PM)  Pap Date: 22 (2024 12:46 PM)  Pap Result Notes: wnl (2024 12:46 PM)    Complications: none  Gravita/Parity:   Contraception: current -vasectomy; Previous -   Sexually transmitted disease history:HPV, GC  Number of sexual partners: current sexual partners: 1, yrs, Lifetime partners:   Pap history: 2022 Last pap/result: NILM, neg HRHPV ; history abnormals: history LEEP  Date of last mammogram: has order -; history abnormals   Abuse history: denies  Vaginal discharge: enies  Bladder symptoms: denies    PAST MEDICAL HISTORY:   Past Medical History:    Anxiety    Per pt statement    Asthma (HCC)    Cough    Ear problems    Extrinsic asthma, unspecified    Gonorrhea    tx around 20 yrs    HPV in female        PAST SURGICAL HISTORY:   Past Surgical History:   Procedure Laterality Date    Leep                PAST OB HISTORY:  OB History    Para Term  AB Living   5 3 3   2 3   SAB IAB Ectopic Multiple Live Births         0 1      # Outcome Date GA Lbr Aleksandar/2nd Weight Sex Type Anes PTL Lv   5 Term 22 40w0d 02:56 / 00:56 7 lb 7.6 oz (3.39 kg) M NORMAL SPONT EPI N CARLOS      Complications: Variable  decelerations, Late decelerations   4 Term 01/12/19 41w0d   F NORMAL SPONT EPI     3 Term 01/29/13 40w0d   F NORMAL SPONT EPI     2 AB            1 AB                CURRENT MEDICATIONS:      Current Outpatient Medications:     triamcinolone 0.1 % External Cream, Apply topically 2 (two) times daily as needed., Disp: 60 g, Rfl: 0    albuterol 108 (90 Base) MCG/ACT Inhalation Aero Soln, Inhale 2 puffs into the lungs every 6 (six) hours as needed for Wheezing., Disp: 18 g, Rfl: 0    Vitamin D, Ergocalciferol, 50 MCG (2000 UT) Oral Cap, Take 1 tablet by mouth As Directed., Disp: , Rfl:     Prenatal Vit-DSS-Fe Cbn-FA (PRENATAL AD) Oral Tab, Take 1 tablet by mouth daily., Disp: , Rfl:     ALLERGIES:  Allergies   Allergen Reactions    Cefaclor HIVES and RASH    Ceftriaxone HIVES     Pt developed hives and petechiae. Confirmed by dr. Angulo at bedside.       SOCIAL HISTORY:  Social History     Socioeconomic History    Marital status:    Tobacco Use    Smoking status: Never     Passive exposure: Never    Smokeless tobacco: Never   Vaping Use    Vaping status: Never Used   Substance and Sexual Activity    Alcohol use: No    Drug use: Never    Sexual activity: Yes     Partners: Male     Birth control/protection: Vasectomy   Other Topics Concern    Blood Transfusions No       FAMILY HISTORY:  Family History   Problem Relation Age of Onset    Diabetes Father     Diabetes Paternal Grandfather     Cancer Maternal Grandmother         Lung CA    Asthma Mother     Other (Other) Mother     No Known Problems Daughter     Cancer Maternal Grandfather     Other (Other) Paternal Grandmother         lung issue    No Known Problems Daughter      ASSESSMENTS:  REVIEW OF SYSTEMS:  CONSTITUTIONAL:  negative for fevers, chills and sweats    EYES:  negative for  blurred vision and visual disturbance  RESPIRATORY:  negative for  cough and shortness of breath  CARDIOVASCULAR:  negative for  chest pain, palpitations  GASTROINTESTINAL:  No  constipation/diarrhea, no pain  GENITOURINARY:  See History of Present Illness  INTEGUMENT/BREAST: Breast: no masses, no nipple discharge  ENDOCRINE:  negative for acne, constipation, diarrhea, cold intolerance, heat intolerance, fatigue, hair loss, weight gain and weight loss  MUSCULOSKELETAL:  negative for joint pain  NEUROLOGICAL:  negative for dizziness/lightheadedness and headaches  BEHAVIOR/PSYCH:  Negative for depressed mood, anhedonia and anxiety    PHYSICAL EXAM  Patient's last menstrual period was 05/30/2024.   Vitals:    06/14/24 1243   BP: 114/66   Weight: 207 lb 12.8 oz (94.3 kg)   Height: 62.8\"       CONSTITUTIONAL: Awake, alert, cooperative, no apparent distress, and appears stated age   NECK:  symmetrical, trachea midline, no adenopathy, thyroid symmetric, not enlarged   LUNGS: respiration unlabored  CARDIOVASCULAR: no peripheral edema or varicosities, skin warm and dry  ABDOMEN: Soft, non-distended, non-tender, no masses palpated    CHEST/BREASTS: Breasts symmetrical, skin without lesion(s), no nipple retraction or dimpling, no nipple discharge, no masses palpated, no axillary or supraclavicular adenopathy  GENITAL/URINARY:    External Genitalia:  General appearance; normal, Hair distribution; normal, Lesions absent   Urethral Meatus:  Lesions absent, Prolapse absent  Bladder:  Tenderness absent, Cystocele absent  Vagina:  Discharge absent, Lesions absent, Pelvic support normal  Cervix:  Lesions absent, Discharge absent, Tenderness absent  Uterus:  Size normal, Masses absent, Tenderness absent  Adnexa:  Masses absent, Tenderness absent  Anus/Perineum:  Lesions absent    MUSCULOSKELETAL: There is no redness, warmth, or swelling of the joints.  Full range of motion noted.  Motor strength is 5 out of 5 all extremities bilaterally.  Tone is normal.  NEUROLOGIC: Patient is awake, alert and oriented to name, place and time.  Casual gait is normal.  SKIN: no bruising or bleeding and no rashes  PSYCHIATRIC:  Behavior:  Appropriate  Mood:  appropriate  ASSESSMENT AND PLAN:  1. Encounter for annual routine gynecological examination  History leep - needs pap 2025, q 3 years  Follow up with PCP if recurrent symptoms as mentioned in HPI.  Has mammogram order    - CBC W Differential W Platelet  - Comp Metabolic Panel (14)  - Hemoglobin A1C  - Lipid Panel    2. Obesity affecting pregnancy, antepartum, unspecified obesity type (HCC)    - CBC W Differential W Platelet  - Comp Metabolic Panel (14)  - Hemoglobin A1C  - Lipid Panel       Preventive Medicine in a 44 year old female  Health Maintenance Topics with due status: Overdue       Topic Date Due    Mammogram Never done    COVID-19 Vaccine 09/01/2023    Annual Depression Screening 01/01/2024       COUNSELING/EDUCATION PERFORMED:   Contraception.  Form chosen:  vasectomy  Cervical Cancer Screening  Breast Cancer Screening - monthly self breast exam  Appropriate diet  Exercise    follow up 1 yr or as needed  Diamond Manley MD

## 2024-06-17 ENCOUNTER — PATIENT MESSAGE (OUTPATIENT)
Dept: OBGYN CLINIC | Facility: CLINIC | Age: 44
End: 2024-06-17

## 2024-07-17 ENCOUNTER — TELEPHONE (OUTPATIENT)
Dept: OBGYN CLINIC | Facility: CLINIC | Age: 44
End: 2024-07-17

## 2024-07-31 ENCOUNTER — TELEPHONE (OUTPATIENT)
Dept: OBGYN CLINIC | Facility: CLINIC | Age: 44
End: 2024-07-31

## 2024-09-04 ENCOUNTER — TELEPHONE (OUTPATIENT)
Dept: OBGYN CLINIC | Facility: CLINIC | Age: 44
End: 2024-09-04

## 2024-12-05 ENCOUNTER — LAB ENCOUNTER (OUTPATIENT)
Dept: LAB | Age: 44
End: 2024-12-05
Attending: OBSTETRICS & GYNECOLOGY
Payer: COMMERCIAL

## 2024-12-05 ENCOUNTER — OFFICE VISIT (OUTPATIENT)
Dept: FAMILY MEDICINE CLINIC | Facility: CLINIC | Age: 44
End: 2024-12-05

## 2024-12-05 VITALS
OXYGEN SATURATION: 97 % | DIASTOLIC BLOOD PRESSURE: 74 MMHG | HEIGHT: 62 IN | HEART RATE: 73 BPM | SYSTOLIC BLOOD PRESSURE: 110 MMHG | BODY MASS INDEX: 38.64 KG/M2 | WEIGHT: 210 LBS

## 2024-12-05 DIAGNOSIS — J45.21 MILD INTERMITTENT ASTHMA WITH ACUTE EXACERBATION (HCC): ICD-10-CM

## 2024-12-05 DIAGNOSIS — J32.4 CHRONIC PANSINUSITIS: Primary | ICD-10-CM

## 2024-12-05 PROBLEM — J45.909 ASTHMA (HCC): Status: ACTIVE | Noted: 2022-05-12

## 2024-12-05 LAB
ALBUMIN SERPL-MCNC: 3.9 G/DL (ref 3.2–4.8)
ALBUMIN/GLOB SERPL: 1.2 {RATIO} (ref 1–2)
ALP LIVER SERPL-CCNC: 65 U/L
ALT SERPL-CCNC: 20 U/L
ANION GAP SERPL CALC-SCNC: 4 MMOL/L (ref 0–18)
AST SERPL-CCNC: 21 U/L (ref ?–34)
BASOPHILS # BLD AUTO: 0.04 X10(3) UL (ref 0–0.2)
BASOPHILS NFR BLD AUTO: 0.7 %
BILIRUB SERPL-MCNC: 0.5 MG/DL (ref 0.3–1.2)
BUN BLD-MCNC: 13 MG/DL (ref 9–23)
BUN/CREAT SERPL: 16 (ref 10–20)
CALCIUM BLD-MCNC: 10.1 MG/DL (ref 8.7–10.4)
CHLORIDE SERPL-SCNC: 105 MMOL/L (ref 98–112)
CHOLEST SERPL-MCNC: 139 MG/DL (ref ?–200)
CO2 SERPL-SCNC: 30 MMOL/L (ref 21–32)
CREAT BLD-MCNC: 0.81 MG/DL
DEPRECATED RDW RBC AUTO: 41.4 FL (ref 35.1–46.3)
EGFRCR SERPLBLD CKD-EPI 2021: 92 ML/MIN/1.73M2 (ref 60–?)
EOSINOPHIL # BLD AUTO: 0.09 X10(3) UL (ref 0–0.7)
EOSINOPHIL NFR BLD AUTO: 1.5 %
ERYTHROCYTE [DISTWIDTH] IN BLOOD BY AUTOMATED COUNT: 12.6 % (ref 11–15)
EST. AVERAGE GLUCOSE BLD GHB EST-MCNC: 111 MG/DL (ref 68–126)
FASTING PATIENT LIPID ANSWER: YES
FASTING STATUS PATIENT QL REPORTED: YES
GLOBULIN PLAS-MCNC: 3.3 G/DL (ref 2–3.5)
GLUCOSE BLD-MCNC: 78 MG/DL (ref 70–99)
HBA1C MFR BLD: 5.5 % (ref ?–5.7)
HCT VFR BLD AUTO: 42.8 %
HDLC SERPL-MCNC: 58 MG/DL (ref 40–59)
HGB BLD-MCNC: 13.9 G/DL
IMM GRANULOCYTES # BLD AUTO: 0.01 X10(3) UL (ref 0–1)
IMM GRANULOCYTES NFR BLD: 0.2 %
LDLC SERPL CALC-MCNC: 64 MG/DL (ref ?–100)
LYMPHOCYTES # BLD AUTO: 1.97 X10(3) UL (ref 1–4)
LYMPHOCYTES NFR BLD AUTO: 32.8 %
MCH RBC QN AUTO: 28.8 PG (ref 26–34)
MCHC RBC AUTO-ENTMCNC: 32.5 G/DL (ref 31–37)
MCV RBC AUTO: 88.8 FL
MONOCYTES # BLD AUTO: 0.57 X10(3) UL (ref 0.1–1)
MONOCYTES NFR BLD AUTO: 9.5 %
NEUTROPHILS # BLD AUTO: 3.33 X10 (3) UL (ref 1.5–7.7)
NEUTROPHILS # BLD AUTO: 3.33 X10(3) UL (ref 1.5–7.7)
NEUTROPHILS NFR BLD AUTO: 55.3 %
NONHDLC SERPL-MCNC: 81 MG/DL (ref ?–130)
OSMOLALITY SERPL CALC.SUM OF ELEC: 287 MOSM/KG (ref 275–295)
PLATELET # BLD AUTO: 246 10(3)UL (ref 150–450)
POTASSIUM SERPL-SCNC: 4.2 MMOL/L (ref 3.5–5.1)
PROT SERPL-MCNC: 7.2 G/DL (ref 5.7–8.2)
RBC # BLD AUTO: 4.82 X10(6)UL
SODIUM SERPL-SCNC: 139 MMOL/L (ref 136–145)
TRIGL SERPL-MCNC: 87 MG/DL (ref 30–149)
VLDLC SERPL CALC-MCNC: 13 MG/DL (ref 0–30)
WBC # BLD AUTO: 6 X10(3) UL (ref 4–11)

## 2024-12-05 PROCEDURE — 99214 OFFICE O/P EST MOD 30 MIN: CPT

## 2024-12-05 PROCEDURE — 80053 COMPREHEN METABOLIC PANEL: CPT | Performed by: OBSTETRICS & GYNECOLOGY

## 2024-12-05 PROCEDURE — 85025 COMPLETE CBC W/AUTO DIFF WBC: CPT | Performed by: OBSTETRICS & GYNECOLOGY

## 2024-12-05 PROCEDURE — 36415 COLL VENOUS BLD VENIPUNCTURE: CPT | Performed by: OBSTETRICS & GYNECOLOGY

## 2024-12-05 PROCEDURE — 83036 HEMOGLOBIN GLYCOSYLATED A1C: CPT | Performed by: OBSTETRICS & GYNECOLOGY

## 2024-12-05 PROCEDURE — 80061 LIPID PANEL: CPT | Performed by: OBSTETRICS & GYNECOLOGY

## 2024-12-05 RX ORDER — ALBUTEROL SULFATE 90 UG/1
2 INHALANT RESPIRATORY (INHALATION) EVERY 6 HOURS PRN
Qty: 18 G | Refills: 0 | Status: SHIPPED | OUTPATIENT
Start: 2024-12-05

## 2024-12-05 RX ORDER — FLUTICASONE PROPIONATE 50 MCG
1 SPRAY, SUSPENSION (ML) NASAL DAILY
Qty: 16 G | Refills: 0 | Status: SHIPPED | OUTPATIENT
Start: 2024-12-05

## 2024-12-05 NOTE — PROGRESS NOTES
Subjective:   Gayla Powers is a 44 year old female who presents for Sinus Problem (Pt has been having sinus pressure and eye and ear pain and headaches for 3 weeks pt states she has allergies. ).     Patient presents with sinusitis. The patient reports sinus infections for 3 weeks.  Her symptoms include nasal congestion, sneezing, sniffing, snoring, foul breath, mouthbreathing, headaches, facial pain, puffiness of the eyes, itchy eyes, itchy nose.  There has not been a history of nasal congestion, sneezing, sniffing, snoring, foul breath, mouthbreathing, headaches, facial pain, itchy eyes, itchy nose, ear pressure. Patient has tried OTC medications such as Claritin, oral decongestants and saline nasal spray.        History/Other:      Chief Complaint Reviewed and Verified  Nursing Notes Reviewed and   Verified  Tobacco Reviewed  Allergies Reviewed  Medications Reviewed    Problem List Reviewed  Medical History Reviewed  Surgical History   Reviewed  OB Status Reviewed  Family History Reviewed  Social History   Reviewed           Tobacco:  She has never smoked tobacco.      Current Outpatient Medications   Medication Sig Dispense Refill    albuterol 108 (90 Base) MCG/ACT Inhalation Aero Soln Inhale 2 puffs into the lungs every 6 (six) hours as needed for Wheezing. 18 g 0    fluticasone propionate 50 MCG/ACT Nasal Suspension 1 spray by Nasal route daily. One spray per each nostril daily. 16 g 0    amoxicillin clavulanate 875-125 MG Oral Tab Take 1 tablet by mouth 2 (two) times daily for 10 days. 20 tablet 0    Vitamin D, Ergocalciferol, 50 MCG (2000 UT) Oral Cap Take 1 tablet by mouth As Directed.      Prenatal Vit-DSS-Fe Cbn-FA (PRENATAL AD) Oral Tab Take 1 tablet by mouth daily.      triamcinolone 0.1 % External Cream Apply topically 2 (two) times daily as needed. 60 g 0       Allergies[1]         Review of Systems   Constitutional:  Positive for fatigue. Negative for activity change.   HENT:   Positive for congestion, ear discharge, ear pain, postnasal drip, sinus pressure, sinus pain and sneezing. Negative for rhinorrhea.    Eyes: Negative.  Negative for redness.   Respiratory: Negative.  Negative for cough, shortness of breath and wheezing.    Cardiovascular: Negative.  Negative for chest pain.   Gastrointestinal: Negative.  Negative for abdominal pain, constipation, diarrhea, nausea and vomiting.   Endocrine: Negative.    Genitourinary: Negative.  Negative for difficulty urinating and frequency.   Musculoskeletal: Negative.  Negative for back pain, joint swelling and myalgias.   Skin: Negative.  Negative for rash.   Allergic/Immunologic: Negative.    Neurological:  Positive for headaches. Negative for dizziness, syncope and light-headedness.   Hematological: Negative.    Psychiatric/Behavioral: Negative.           Objective:   /74 (BP Location: Right arm, Patient Position: Sitting, Cuff Size: large)   Pulse 73   Ht 5' 2\" (1.575 m)   Wt 210 lb (95.3 kg)   LMP 11/08/2024 (Approximate)   SpO2 97%   Breastfeeding Yes   BMI 38.41 kg/m²  Estimated body mass index is 38.41 kg/m² as calculated from the following:    Height as of this encounter: 5' 2\" (1.575 m).    Weight as of this encounter: 210 lb (95.3 kg).      Physical Exam  Vitals and nursing note reviewed.   Constitutional:       Appearance: Normal appearance. She is obese.   HENT:      Head: Normocephalic and atraumatic.      Right Ear: Tympanic membrane normal.      Left Ear: Tympanic membrane is injected.      Nose: Congestion present.      Right Turbinates: Enlarged.      Left Turbinates: Enlarged and swollen.      Right Sinus: Maxillary sinus tenderness and frontal sinus tenderness present.      Left Sinus: Maxillary sinus tenderness and frontal sinus tenderness present.      Mouth/Throat:      Lips: Pink.      Mouth: Mucous membranes are moist.      Pharynx: Posterior oropharyngeal erythema and postnasal drip present.   Eyes:       Extraocular Movements: Extraocular movements intact.      Conjunctiva/sclera: Conjunctivae normal.      Pupils: Pupils are equal, round, and reactive to light.   Cardiovascular:      Rate and Rhythm: Normal rate and regular rhythm.      Pulses: Normal pulses.      Heart sounds: Normal heart sounds.   Pulmonary:      Effort: Pulmonary effort is normal.      Breath sounds: Decreased breath sounds present.   Chest:      Chest wall: Tenderness present.   Abdominal:      General: Abdomen is flat. Bowel sounds are normal.      Palpations: Abdomen is soft.   Musculoskeletal:         General: Normal range of motion.      Cervical back: Normal range of motion and neck supple.   Skin:     General: Skin is warm and dry.   Neurological:      General: No focal deficit present.      Mental Status: She is alert and oriented to person, place, and time. Mental status is at baseline.   Psychiatric:         Mood and Affect: Mood normal.         Behavior: Behavior normal.         Thought Content: Thought content normal.         Judgment: Judgment normal.           Assessment & Plan:     1. Chronic pansinusitis  - fluticasone propionate 50 MCG/ACT Nasal Suspension; 1 spray by Nasal route daily. One spray per each nostril daily.  Dispense: 16 g; Refill: 0  - amoxicillin clavulanate 875-125 MG Oral Tab; Take 1 tablet by mouth 2 (two) times daily for 10 days.  Dispense: 20 tablet; Refill: 0    2. Mild intermittent asthma with acute exacerbation (HCC)  - albuterol 108 (90 Base) MCG/ACT Inhalation Aero Soln; Inhale 2 puffs into the lungs every 6 (six) hours as needed for Wheezing.  Dispense: 18 g; Refill: 0         Medication use, effects and side effects discussed in detail with patient. The patient  indicated understanding of the diagnosis and agreed with the plan of care.    Return if symptoms worsen or fail to improve.    BEBETO Zamarripa         [1]   Allergies  Allergen Reactions    Cefaclor HIVES and RASH    Ceftriaxone HIVES      Pt developed hives and petechiae. Confirmed by dr. Angulo at bedside.

## 2025-04-24 ENCOUNTER — NURSE TRIAGE (OUTPATIENT)
Dept: INTERNAL MEDICINE CLINIC | Facility: CLINIC | Age: 45
End: 2025-04-24

## 2025-04-24 ENCOUNTER — OFFICE VISIT (OUTPATIENT)
Dept: INTERNAL MEDICINE CLINIC | Facility: CLINIC | Age: 45
End: 2025-04-24
Payer: COMMERCIAL

## 2025-04-24 VITALS
SYSTOLIC BLOOD PRESSURE: 113 MMHG | HEIGHT: 62 IN | DIASTOLIC BLOOD PRESSURE: 79 MMHG | TEMPERATURE: 99 F | RESPIRATION RATE: 20 BRPM | WEIGHT: 211 LBS | BODY MASS INDEX: 38.83 KG/M2 | OXYGEN SATURATION: 95 % | HEART RATE: 96 BPM

## 2025-04-24 DIAGNOSIS — J45.21 MILD INTERMITTENT ASTHMA WITH ACUTE EXACERBATION (HCC): ICD-10-CM

## 2025-04-24 DIAGNOSIS — M25.562 ACUTE PAIN OF BOTH KNEES: Primary | ICD-10-CM

## 2025-04-24 DIAGNOSIS — M25.561 ACUTE PAIN OF BOTH KNEES: Primary | ICD-10-CM

## 2025-04-24 PROCEDURE — 99213 OFFICE O/P EST LOW 20 MIN: CPT | Performed by: NURSE PRACTITIONER

## 2025-04-24 RX ORDER — NEBULIZER ACCESSORIES
KIT MISCELLANEOUS
Qty: 1 EACH | Refills: 0 | Status: SHIPPED | OUTPATIENT
Start: 2025-04-24

## 2025-04-24 RX ORDER — ALBUTEROL SULFATE 0.83 MG/ML
2.5 SOLUTION RESPIRATORY (INHALATION) EVERY 4 HOURS PRN
Qty: 3 ML | Refills: 0 | Status: SHIPPED | OUTPATIENT
Start: 2025-04-24

## 2025-04-24 RX ORDER — METHYLPREDNISOLONE 4 MG/1
TABLET ORAL
Qty: 21 TABLET | Refills: 0 | Status: SHIPPED | OUTPATIENT
Start: 2025-04-24

## 2025-04-24 RX ORDER — ALBUTEROL SULFATE 90 UG/1
2 INHALANT RESPIRATORY (INHALATION) EVERY 6 HOURS PRN
Qty: 18 G | Refills: 0 | Status: SHIPPED | OUTPATIENT
Start: 2025-04-24

## 2025-04-24 NOTE — PROGRESS NOTES
Gayla Powers is a 45 year old female.  Chief Complaint   Patient presents with    Cough     Pain when coughing    Shortness Of Breath    Wheezing    Fever     HPI:   She presents cough with yellow sputum, SOB with exertion, wheezing, headaches, congestion, right ear pain and runny nose.     Symptoms started on Saturday.   Her last fever was about 36-48 hours ago.   Her youngest child is sick.   She has a history of asthma and allergies. She is using albuterol inhaler as needed.   She does have a nebulizer machine at home. She is in need of new tubing and medication for her nebulizer machine.     She is also having bilateral knee pain. She is a .       Current Medications[1]   Past Medical History[2]   Past Surgical History[3]   Social History:  Short Social Hx on File[4]   Family History[5]   Allergies[6]     REVIEW OF SYSTEMS:     Review of Systems   Constitutional:  Negative for fever.   HENT:  Positive for congestion, ear pain (right ear pain) and rhinorrhea. Negative for sore throat.    Respiratory:  Positive for cough, shortness of breath (with exertion) and wheezing.    Gastrointestinal:  Negative for abdominal pain, constipation, diarrhea, nausea and vomiting.   Genitourinary: Negative.    Musculoskeletal:  Positive for arthralgias (bilateral knees).   Skin: Negative.    Neurological:  Positive for headaches. Negative for dizziness.   Psychiatric/Behavioral: Negative.        Wt Readings from Last 5 Encounters:   04/24/25 211 lb (95.7 kg)   12/05/24 210 lb (95.3 kg)   06/14/24 207 lb 12.8 oz (94.3 kg)   11/29/23 210 lb (95.3 kg)   10/09/23 213 lb (96.6 kg)     Body mass index is 38.59 kg/m².      EXAM:   /79 (BP Location: Right arm, Patient Position: Sitting, Cuff Size: adult)   Pulse 96   Temp 98.5 °F (36.9 °C) (Oral)   Resp 20   Ht 5' 2\" (1.575 m)   Wt 211 lb (95.7 kg)   LMP  (LMP Unknown)   SpO2 95%   Breastfeeding Yes   BMI 38.59 kg/m²     Physical Exam  Vitals reviewed.    Constitutional:       Appearance: Normal appearance.   HENT:      Head: Normocephalic.      Right Ear: Tympanic membrane normal.      Left Ear: Tympanic membrane normal.   Eyes:      Extraocular Movements: Extraocular movements intact.      Pupils: Pupils are equal, round, and reactive to light.   Cardiovascular:      Rate and Rhythm: Normal rate and regular rhythm.      Pulses: Normal pulses.   Pulmonary:      Breath sounds: Wheezing present.   Abdominal:      General: Bowel sounds are normal.      Palpations: Abdomen is soft.   Musculoskeletal:         General: No swelling.      Cervical back: Normal range of motion and neck supple.      Right knee: Decreased range of motion.      Left knee: Decreased range of motion.   Skin:     General: Skin is warm and dry.   Neurological:      Mental Status: She is alert and oriented to person, place, and time.   Psychiatric:         Mood and Affect: Mood normal.         Behavior: Behavior normal.            ASSESSMENT AND PLAN:   1. Mild intermittent asthma with acute exacerbation (HCC)  - albuterol 108 (90 Base) MCG/ACT Inhalation Aero Soln; Inhale 2 puffs into the lungs every 6 (six) hours as needed for Wheezing.  Dispense: 18 g; Refill: 0  - albuterol (2.5 MG/3ML) 0.083% Inhalation Nebu Soln; Take 3 mL (2.5 mg total) by nebulization every 4 (four) hours as needed for Wheezing.  Dispense: 3 mL; Refill: 0  - XR CHEST PA + LAT CHEST (CPT=71046); Future  - SARS-CoV-2/Flu A and B/RSV by PCR ([facility]); Future  - SARS-CoV-2/Flu A and B/RSV by PCR ([facility])  - methylPREDNISolone 4 MG Oral Tablet Therapy Pack; Take as directed  Dispense: 21 tablet; Refill: 0  - Respiratory Therapy Supplies (NEBULIZER/TUBING/MOUTHPIECE) Does not apply Kit; Use every 6 hours as needed for wheezing or shortness of breath.  Dispense: 1 each; Refill: 0    2. Acute pain of both knees  - Ortho Referral - In Network        The patient indicates understanding of these issues and agrees to the  plan.  Return for if symptoms do not resolve.         [1]   Current Outpatient Medications   Medication Sig Dispense Refill    albuterol 108 (90 Base) MCG/ACT Inhalation Aero Soln Inhale 2 puffs into the lungs every 6 (six) hours as needed for Wheezing. 18 g 0    albuterol (2.5 MG/3ML) 0.083% Inhalation Nebu Soln Take 3 mL (2.5 mg total) by nebulization every 4 (four) hours as needed for Wheezing. 3 mL 0    methylPREDNISolone 4 MG Oral Tablet Therapy Pack Take as directed 21 tablet 0    Respiratory Therapy Supplies (NEBULIZER/TUBING/MOUTHPIECE) Does not apply Kit Use every 6 hours as needed for wheezing or shortness of breath. 1 each 0    fluticasone propionate 50 MCG/ACT Nasal Suspension 1 spray by Nasal route daily. One spray per each nostril daily. 16 g 0    Vitamin D, Ergocalciferol, 50 MCG ( UT) Oral Cap Take 1 tablet by mouth As Directed.      Prenatal Vit-DSS-Fe Cbn-FA (PRENATAL AD) Oral Tab Take 1 tablet by mouth daily.     [2]   Past Medical History:   Allergic rhinitis    Anxiety    Per pt statement    Asthma (HCC)    Cough    Depression    Ear problems    Extrinsic asthma, unspecified    Gonorrhea    tx around 20 yrs    Headache    HPV in female    Obesity    Pain    Sexually transmitted disease    Not sure of date   [3]   Past Surgical History:  Procedure Laterality Date    Leep           [4]   Social History  Socioeconomic History    Marital status:    Tobacco Use    Smoking status: Never     Passive exposure: Never    Smokeless tobacco: Never   Vaping Use    Vaping status: Never Used   Substance and Sexual Activity    Alcohol use: Not Currently     Comment: A drink or two per month    Drug use: Never    Sexual activity: Yes     Partners: Male     Birth control/protection: Vasectomy   Other Topics Concern    Blood Transfusions No     Social Drivers of Health     Food Insecurity: No Food Insecurity (2025)    NCSS - Food Insecurity     Worried About Running Out of Food in the Last  Year: No     Ran Out of Food in the Last Year: No   Transportation Needs: No Transportation Needs (4/24/2025)    NCSS - Transportation     Lack of Transportation: No   Housing Stability: Not At Risk (4/24/2025)    NCSS - Housing/Utilities     Has Housing: Yes     Worried About Losing Housing: No     Unable to Get Utilities: No   [5]   Family History  Problem Relation Age of Onset    Diabetes Father     Diabetes Paternal Grandfather     Cancer Maternal Grandmother         Lung cancer, smoker    Asthma Mother     Other (Other) Mother     No Known Problems Daughter     Cancer Maternal Grandfather     Other (Other) Paternal Grandmother         lung issue    No Known Problems Daughter    [6]   Allergies  Allergen Reactions    Cefaclor HIVES and RASH    Ceftriaxone HIVES     Pt developed hives and petechiae. Confirmed by dr. Angulo at bedside.

## 2025-04-24 NOTE — TELEPHONE ENCOUNTER
Action Requested: Summary for Provider     []  Critical Lab, Recommendations Needed  [] Need Additional Advice  [x]   FYI    []   Need Orders  [] Need Medications Sent to Pharmacy  []  Other     SUMMARY: Visit today with Gayla TATE, Former pt of Dr Flores, will need to establish care with a new Primary Care Provider (PCP)     Reason for call: Acute  Onset: yesterday       Patient calling office, transferred to Nurse Triage from  , cold transfer.   Patient thought she scheduled in [place] today, but nothing scheduled.   Has low grade fever, cough, mild and familiar chest discomfort that she experiences with asthma and respiratory infections.   Denies shortness of breath, difficulty breathing, chest pain, chest pressure.   Advised if chest discomfort becomes concerning and changes, go to Emergency Room or call 911. She verbalizes understanding of all information, and agreeable to plan.     Triaged and advised care advice     Evaluation advised , appointment made for today.   Address verified.   Patient confirms that she does not have mobility issues and will be able to take the stairs as elevators are not working today at [facility].     Advised to monitor symptoms.  RN advised if symptoms get severely worse, patient should seek care at Emergency Room or Immediate Care.  RN also informed patient to seek immediate medical attention at ER if patient experiences severe/worsening symptoms, shortness of breath, chest pain, or severe pain.  Patient verbalizes understanding and is agreeable to instructions.           Future Appointments   Date Time Provider Department Center   4/24/2025  3:40 PM Gayla Sauceda APRN [facility]       Reason for Disposition   Patient wants to be seen    Protocols used: Cough-A-[place]

## 2025-04-25 ENCOUNTER — HOSPITAL ENCOUNTER (OUTPATIENT)
Dept: GENERAL RADIOLOGY | Age: 45
Discharge: HOME | End: 2025-04-25
Attending: NURSE PRACTITIONER
Payer: COMMERCIAL

## 2025-04-25 DIAGNOSIS — J45.21 MILD INTERMITTENT ASTHMA WITH ACUTE EXACERBATION (HCC): ICD-10-CM

## 2025-04-25 LAB
FLUAV + FLUBV RNA SPEC NAA+PROBE: NOT DETECTED
FLUAV + FLUBV RNA SPEC NAA+PROBE: NOT DETECTED
RSV RNA SPEC NAA+PROBE: NOT DETECTED
SARS-COV-2 RNA RESP QL NAA+PROBE: NOT DETECTED

## 2025-04-25 PROCEDURE — 71046 X-RAY EXAM CHEST 2 VIEWS: CPT | Performed by: NURSE PRACTITIONER

## 2025-07-22 ENCOUNTER — OFFICE VISIT (OUTPATIENT)
Dept: FAMILY MEDICINE CLINIC | Facility: CLINIC | Age: 45
End: 2025-07-22

## 2025-07-22 VITALS
HEART RATE: 66 BPM | DIASTOLIC BLOOD PRESSURE: 69 MMHG | HEIGHT: 62 IN | SYSTOLIC BLOOD PRESSURE: 105 MMHG | OXYGEN SATURATION: 96 % | WEIGHT: 204 LBS | BODY MASS INDEX: 37.54 KG/M2

## 2025-07-22 DIAGNOSIS — M72.2 PLANTAR FASCIITIS OF RIGHT FOOT: ICD-10-CM

## 2025-07-22 DIAGNOSIS — M54.41 ACUTE RIGHT-SIDED LOW BACK PAIN WITH RIGHT-SIDED SCIATICA: Primary | ICD-10-CM

## 2025-07-22 PROCEDURE — 99214 OFFICE O/P EST MOD 30 MIN: CPT

## 2025-07-22 RX ORDER — NAPROXEN 500 MG/1
500 TABLET ORAL 2 TIMES DAILY WITH MEALS
Qty: 28 TABLET | Refills: 0 | Status: SHIPPED | OUTPATIENT
Start: 2025-07-22 | End: 2025-08-05

## 2025-07-22 RX ORDER — NEBULIZER
EACH MISCELLANEOUS
COMMUNITY
Start: 2025-04-24

## 2025-08-26 ENCOUNTER — OFFICE VISIT (OUTPATIENT)
Dept: OBGYN CLINIC | Facility: CLINIC | Age: 45
End: 2025-08-26

## 2025-08-26 VITALS
BODY MASS INDEX: 37.36 KG/M2 | HEIGHT: 62 IN | DIASTOLIC BLOOD PRESSURE: 62 MMHG | WEIGHT: 203 LBS | SYSTOLIC BLOOD PRESSURE: 110 MMHG

## 2025-08-26 DIAGNOSIS — O34.40: ICD-10-CM

## 2025-08-26 DIAGNOSIS — E66.812 CLASS 2 OBESITY WITHOUT SERIOUS COMORBIDITY WITH BODY MASS INDEX (BMI) OF 37.0 TO 37.9 IN ADULT, UNSPECIFIED OBESITY TYPE: ICD-10-CM

## 2025-08-26 DIAGNOSIS — Z98.890: ICD-10-CM

## 2025-08-26 DIAGNOSIS — Z01.419 ENCOUNTER FOR ANNUAL ROUTINE GYNECOLOGICAL EXAMINATION: Primary | ICD-10-CM

## 2025-08-26 PROCEDURE — 88175 CYTOPATH C/V AUTO FLUID REDO: CPT | Performed by: OBSTETRICS & GYNECOLOGY

## 2025-08-26 PROCEDURE — 99396 PREV VISIT EST AGE 40-64: CPT | Performed by: OBSTETRICS & GYNECOLOGY

## 2025-08-26 PROCEDURE — 87624 HPV HI-RISK TYP POOLED RSLT: CPT | Performed by: OBSTETRICS & GYNECOLOGY

## 2025-08-27 LAB — HPV E6+E7 MRNA CVX QL NAA+PROBE: NEGATIVE

## (undated) DIAGNOSIS — Z34.81 ENCOUNTER FOR SUPERVISION OF OTHER NORMAL PREGNANCY IN FIRST TRIMESTER: Primary | ICD-10-CM

## (undated) NOTE — LETTER
Mark Ott, [address]  [address], [place]       05/08/18        Patient: Delmi Somers   YOB: 1980   Date of Visit: 5/8/2018       Dear  Dr. Gabby Cota MD,      Thank you for referring Delmi Somers to my prac

## (undated) NOTE — ED AVS SNAPSHOT
Lee Rodriguez   MRN: [id number]    Department:  [facility] Emergency Department   Date of Visit:  3/17/2018           Disclosure     Insurance plans vary and the physician(s) referred by the ER may not be covered by your plan.  Please contac CARE PHYSICIAN AT ONCE OR RETURN IMMEDIATELY TO THE EMERGENCY DEPARTMENT. If you have been prescribed any medication(s), please fill your prescription right away and begin taking the medication(s) as directed.   If you believe that any of the medications